# Patient Record
Sex: MALE | Race: WHITE
[De-identification: names, ages, dates, MRNs, and addresses within clinical notes are randomized per-mention and may not be internally consistent; named-entity substitution may affect disease eponyms.]

---

## 2017-02-06 ENCOUNTER — HOSPITAL ENCOUNTER (EMERGENCY)
Dept: HOSPITAL 68 - ERH | Age: 52
End: 2017-02-06
Payer: COMMERCIAL

## 2017-02-06 VITALS — BODY MASS INDEX: 28.49 KG/M2 | HEIGHT: 73 IN | WEIGHT: 215 LBS

## 2017-02-06 VITALS — SYSTOLIC BLOOD PRESSURE: 114 MMHG | DIASTOLIC BLOOD PRESSURE: 65 MMHG

## 2017-02-06 DIAGNOSIS — F10.129: Primary | ICD-10-CM

## 2017-02-06 NOTE — ED AMS/SEIZURE/WEAK/DIZZY
History of Present Illness
 
General
Chief Complaint: ETOH/Drug Related Complaint
Stated Complaint: "PER EMS ETOH"
Source: patient, EMS
Exam Limitations: no limitations
 
Vital Signs & Intake/Output
Vital Signs & Intake/Output
 Vital Signs
 
 
Date Time Temp Pulse Resp B/P Pulse O2 O2 Flow FiO2
 
     Ox Delivery Rate 
 
02/06 0304 96.7 73 18 116/64 96 Room Air  
 
 
Allergies
Coded Allergies:
No Known Allergies (10/31/16)
 
Reconcile Medications
Citalopram Hydrobromide (Citalopram HBr) 40 MG TABLET   1 TAB PO DAILY MENTAL 
HEALTH  (Reported)
Disulfiram 250 MG TABLET   1 TAB PO DAILY ALCOHOL  (Reported)
Divalproex Sodium (Depakote) 500 MG TABLET.DR   1 TAB PO BID MENTAL HEALTH  (
Reported)
Divalproex Sodium 250 MG TABLET.DR   1 TAB PO BID MENTAL HEALTH  (Reported)
Lamotrigine (Lamictal 25MG) 25 MG TABLET   5 TAB PO DAILY MENTAL HEALTH  (
Reported)
Methylphenidate Hydrochloride (Ritalin LA) 20 MG CER   20 MG PO QAM ADHD  (
Reported)
Metoprolol Succinate 50 MG TAB.ER.24H   1 TAB PO DAILY HEART  (Reported)
Rosuvastatin Calcium (Crestor) 5 MG TABLET   5 MG PO DAILY CHOLESTEROL  (
Reported)
Sertraline HCl 100 MG TABLET   1 TAB PO DAILY MENTAL HEALTH  (Reported)
 
Triage Nurses Notes Reviewed? yes
Onset: Gradual
Duration: hour(s):
Timing: recent history
Injury Environment: home
Severity: mild, moderate
Modifying Factors:
Improves With: rest. 
Associated Symptoms: "I feel fine and want to go home."
HPI:
51-year-old gentleman history of alcohol abuse presents with mental status 
change.  He states that he missed a dose of Antabuse, "and that gave me the idea
that I could drink tonight."  His roommate called for his aberrant behavior 
consistent with prior episodes of intoxication.
 
He states that he has been drinking tonight and that he feels otherwise well.  
He denies suicidality homicidality or hallucinations.  He does not wish detox.  
He states, "I'll just sleep it off and leave in the morning."
 
Past History
 
Travel History
Traveled to [place] past 21 day No
 
Medical History
Any Pertinent Medical History? see below for history
Neurological: NONE
EENT: NONE
Cardiovascular: hypertension, hyperlipidemia
Respiratory: NONE
Gastrointestinal: NONE
Hepatic: NONE
Renal: NONE
Musculoskeletal: chronic back pain
Psychiatric: bipolar disease
Endocrine: NONE
Blood Disorders: NONE
 
Surgical History
Surgical History: none
 
Psychosocial History
Who do you live with Family
What is your primary language English
 
Family History
Hx Contributory? No
 
Review of Systems
 
Review of Systems
Constitutional:
Reports: no symptoms. 
EENTM:
Reports: no symptoms. 
Respiratory:
Reports: no symptoms. 
Cardiovascular:
Reports: no symptoms. 
GI:
Reports: no symptoms. 
Genitourinary:
Reports: no symptoms. 
Musculoskeletal:
Reports: no symptoms. 
Skin:
Reports: no symptoms. 
Neurological/Psychological:
Reports: no symptoms. 
Hematologic/Endocrine:
Reports: no symptoms. 
Immunologic/Allergic:
Reports: no symptoms. 
All Other Systems: Reviewed and Negative
 
Physical Exam
 
Physical Exam
General Appearance: well developed/nourished
Head: atraumatic
Eyes:
Bilateral: normal appearance. 
Ears, Nose, Throat: normal pharynx, normal ENT inspection
Neck: normal inspection, supple, full range of motion
Respiratory: normal breath sounds, chest non-tender, no respiratory distress
Cardiovascular: regular rate/rhythm
Gastrointestinal: normal bowel sounds, soft, non-tender
Back: normal inspection
Extremities: normal range of motion
Neurologic/Psych: no motor/sensory deficits, awake, alert, oriented x 3
Skin: intact, normal color, warm/dry
 
Core Measures
ACS in differential dx? No
CVA/TIA Diagnosis: No
Severe Sepsis Present: No
Septic Shock Present: No
 
Progress
Differential Diagnosis: alcohol intoxication versus other
Plan of Care:
He declines detox.  He would like to rest for now and leave in the morning.
Initial ED EKG: none
 
Departure
 
Departure
Disposition: HOME OR SELF CARE
Condition: Stable
Clinical Impression
Primary Impression: Alcohol intoxication
Referrals:
DIETER BAUTISTA MD (PCP/Family)
 
Departure Forms:
Customer Survey
General Discharge Information
Comments
2/6/17, 2:45am... pt breathylized 0.022 and .030.... pt to rest this am and will
be safe for discharge in the morning. 
 
2/6/17, 5:26am... pt resting comfortably.  pt wishes to leave, denies SI/HI/
hallucinations.  He ambulates well.  Lucid in ED, able to make his own 
decisions.

## 2017-12-23 ENCOUNTER — HOSPITAL ENCOUNTER (INPATIENT)
Dept: HOSPITAL 68 - ERH | Age: 52
LOS: 6 days | DRG: 754 | End: 2017-12-29
Attending: PSYCHIATRY & NEUROLOGY | Admitting: PSYCHIATRY & NEUROLOGY
Payer: COMMERCIAL

## 2017-12-23 VITALS — HEIGHT: 72 IN | WEIGHT: 190 LBS | BODY MASS INDEX: 25.73 KG/M2

## 2017-12-23 VITALS — DIASTOLIC BLOOD PRESSURE: 89 MMHG | SYSTOLIC BLOOD PRESSURE: 149 MMHG

## 2017-12-23 DIAGNOSIS — F32.9: Primary | ICD-10-CM

## 2017-12-23 DIAGNOSIS — F10.20: ICD-10-CM

## 2017-12-23 LAB
ABSOLUTE GRANULOCYTE CT: 5.8 /CUMM (ref 1.4–6.5)
BASOPHILS # BLD: 0 /CUMM (ref 0–0.2)
BASOPHILS NFR BLD: 0.3 % (ref 0–2)
EOSINOPHIL # BLD: 0.2 /CUMM (ref 0–0.7)
EOSINOPHIL NFR BLD: 2.8 % (ref 0–5)
ERYTHROCYTE [DISTWIDTH] IN BLOOD BY AUTOMATED COUNT: 13 % (ref 11.5–14.5)
GRANULOCYTES NFR BLD: 70.5 % (ref 42.2–75.2)
HCT VFR BLD CALC: 45.9 % (ref 42–52)
LITHIUM SERPL-SCNC: 0.4 MMOL/L (ref 0.6–1.2)
LYMPHOCYTES # BLD: 1.5 /CUMM (ref 1.2–3.4)
MCH RBC QN AUTO: 28.3 PG (ref 27–31)
MCHC RBC AUTO-ENTMCNC: 33.8 G/DL (ref 33–37)
MCV RBC AUTO: 83.6 FL (ref 80–94)
MONOCYTES # BLD: 0.6 /CUMM (ref 0.1–0.6)
PLATELET # BLD: 205 /CUMM (ref 130–400)
PMV BLD AUTO: 8.5 FL (ref 7.4–10.4)
RED BLOOD CELL CT: 5.49 /CUMM (ref 4.7–6.1)
WBC # BLD AUTO: 8.2 /CUMM (ref 4.8–10.8)

## 2017-12-23 PROCEDURE — G0463 HOSPITAL OUTPT CLINIC VISIT: HCPCS

## 2017-12-23 PROCEDURE — G0480 DRUG TEST DEF 1-7 CLASSES: HCPCS

## 2017-12-23 NOTE — ED PSYCHIATRIC COMPLAINT
**See Addendum**
History of Present Illness
 
General
Chief Complaint: ETOH/Drug Related Complaint
Stated Complaint: +si
Source: patient
Exam Limitations: no limitations
 
Vital Signs & Intake/Output
Vital Signs & Intake/Output
 Vital Signs
 
 
Date Time Temp Pulse Resp B/P B/P Pulse O2 O2 Flow FiO2
 
     Mean Ox Delivery Rate 
 
12/23 1425 97.9 90 18 164/98  97 Room Air  
 
12/23 1216 96.5 96 16 146/97  98 Room Air  
 
 
 
Allergies
Coded Allergies:
No Known Allergies (10/31/16)
 
Reconcile Medications
Aripiprazole (Abilify) 10 MG TABLET   1 TAB PO 2000 DEPRESSION
Bupropion HCl (Wellbutrin XL) 150 MG TAB.ER.24H   1 TAB PO 0800 DEPRESSION
Disulfiram 250 MG TABLET   1 TAB PO DAILY ALCOHOL  (Reported)
Gabapentin 300 MG CAPSULE   2 TAB PO AT BEDTIME SLEEP
Lithium Carbonate 300 MG CAPSULE   1 TAB PO BID MOOD STABILIZER
     .
Metoprolol Succ XL (Toprol XL) 25 MG TAB   0.5 TAB PO DAILY HTN
Venlafaxine HCl (Effexor XR) 75 MG CAP.ER.24H   1 TAB PO DAILY DEPRESSION
 
Triage Nurses Notes Reviewed? yes
Onset: Abrupt
Duration: day(s): (FEW)
Timing: recent history
Severity: severe
Associated Symptoms: anxiety, suicidal ideation
HPI:
52 year old male with history of depression/bipolar illness presents to the ER 
for evaluation for suicidal ideation.  He states he had his car beside the train
tracks and was thinking of killing himself.  He thought about his aunt "maribell" 
and wondered what she would have thought of him so he decided to come for help. 
He states that his wife tells him he is a failure and constantly berates him.  
He states he cannot take it anymore.  He has history of suicidal ideation in the
past before.  Denies any illicit drug use, admits to occasional alcohol abuse.  
He states he works part time and is supporting his disabled wife and 4 children.
 
Past History
 
Medical History
Any Pertinent Medical History? see below for history
Neurological: NONE
EENT: NONE
Cardiovascular: hypertension, hyperlipidemia
Respiratory: NONE
Gastrointestinal: NONE
Hepatic: NONE
Renal: NONE
Musculoskeletal: chronic back pain
Psychiatric: bipolar disease, ETOH ABUSE
Endocrine: NONE
Blood Disorders: NONE
GYN/Reproductive: n/a
 
Surgical History
Surgical History: none
 
Psychosocial History
Who do you live with Family
What is your primary language English
 
Family History
Hx Contributory? No
 
Review of Systems
 
Review of Systems
Constitutional:
Denies: chills, fever. 
EENTM:
Reports: no symptoms. 
Respiratory:
Denies: cough, short of breath. 
Cardiovascular:
Denies: chest pain, palpitations. 
GI:
Denies: abdominal pain. 
Genitourinary:
Reports: no symptoms. 
Musculoskeletal:
Reports: no symptoms. 
Skin:
Reports: no symptoms. 
Neurological/Psychological:
Reports: no symptoms. 
Hematologic/Endocrine:
Denies: bruising, bleeding. 
Immunologic/Allergic:
Denies: splenectomy. 
All Other Systems: Reviewed and Negative
 
Physical Exam
 
Physical Exam
General Appearance: well developed/nourished, mild distress
Head: atraumatic
Eyes:
Bilateral: PERRL, EOMI. 
Ears, Nose, Throat: normal pharynx, normal ENT inspection, hearing grossly 
normal
Neck: normal inspection, supple
Respiratory: normal breath sounds
Cardiovascular: regular rate/rhythm
Gastrointestinal: soft, non-tender
Extremities: normal range of motion
Neurological/Psychiatric: awake, alert, depressed affect
Appearance/Memory/Insight: disheveled, impaired insight
Behavoir/Eye Contact/Speech: cooperative, normal speech
Thoughts/Hallucinations: no apparent hallucination
Skin: intact, normal color, warm/dry
SAD PERSONS
 
 
SAD PERSONS Response Value
 
Male Sex? yes 1
 
Age <19 or >45 years? yes 1
 
Depression/Hopelessness? yes 2
 
Previous Attempts/Psych Care yes 1
 
Excessive Ethanol/Drug Use? yes 1
 
Rational Thinking Loss? yes 2
 
Organized/Serious Attempt yes 2
 
Social Support? has no support 1
 
Stated Future Intent? yes 2
 
Total   13
 
 
SAD PERSONS Done? yes
 
Progress
Differential Diagnosis: DEPRESSION, SUICIDAL IDEATION, MEDICATION NONCOMPLIANCE
Plan of Care:
 Orders
 
 
Procedure Date/time Status
 
Regular Diet 12/24 B Active
 
Regular Diet 12/23 D Complete
 
Pathway - chart 12/23 1756 Active
 
Patient Data - inpatient psych 12/23 1746 Active
 
Admit to inpatient psych 12/23 1746 Active
 
EKG 12/23 1746 Active
 
Add-on Test (ER Only) 12/23 1400 Active
 
LITHIUM 12/23 1258 Complete
 
ED CRISIS PSYCH CONSULT 12/23 1224 Active
 
Continuous Observation Monitor 12/23 1213 Active
 
URINE DRUGS OF ABUSE 12/23 1213 Complete
 
ETHANOL 12/23 1213 Complete
 
COMPREHENSIVE METABOLIC PANEL 12/23 1213 Complete
 
CBC WITHOUT DIFFERENTIAL 12/23 1213 Complete
 
Vital Signs 12/23  UNK Active
 
Nursing Misc 12/23  UNK Active
 
CIWA 12/23  UNK Active
 
Alternative Nursing Therapy 12/23  UNK Active
 
Activity/Ambulation 12/23  UNK Active
 
 
 Current Medications
 
 
  Sig/Bettie Start time  Last
 
Medication Dose  Stop Time Status Admin
 
Folic Acid 1 MG DAILY 12/24 1000 UNVr 
 
(Folic Acid)   12/26 1001  
 
Metoprolol Succinate 12.5 MG DAILY 12/24 1000 UNVr 
 
(Toprol XL)     
 
Multivitamins 1 TAB DAILY 12/24 1000 UNVr 
 
(Theragran Vitamins)     
 
Thiamine HCl 100 MG DAILY 12/24 1000 UNVr 
 
(Vitamin B1)   12/26 1001  
 
Venlafaxine HCl 75 MG DAILY 12/24 1000 UNVr 
 
(Effexor Xr)     
 
Bupropion HCl 150 MG 0800 12/24 0800 UNVr 
 
(Wellbutrin XL)     
 
Gabapentin 600 MG AT BEDTIME 12/23 2200 UNVr 
 
(Neurontin)     
 
Lithium Carbonate 300 MG BID 12/23 2200 UNVr 
 
(Lithium Carbonate)     
 
Aripiprazole 10 MG 2000 12/23 2000 UNVr 
 
(Abilify)     
 
Lithium Carbonate 300 MG 0800,2000 12/23 2000 CANr 
 
(Lithium Carbonate)     
 
Acetaminophen 650 MG Q6-PRN PRN 12/23 1800 UNVr 
 
(Tylenol)     
 
Al Hydroxide/Mg  30 ML Q4-6 PRN PRN 12/23 1800 UNVr 
 
Hydroxide     
 
(Maalox Plus)     
 
Benztropine Mesylate 1 MG Q6P PRN 12/23 1800 UNVr 
 
(Cogentin 1 MG      
 
Tablet)     
 
Benztropine Mesylate 1 MG Q6P PRN 12/23 1800 UNVr 
 
(Cogentin)     
 
Haloperidol 5 MG Q6P PRN 12/23 1800 UNVr 
 
(Haldol)     
 
Haloperidol 5 MG Q6P PRN 12/23 1800 UNVr 
 
(Haldol)     
 
Hydroxyzine HCl 50 MG Q4-PRN PRN 12/23 1800 UNVr 
 
(Atarax)     
 
Lorazepam 2 MG Q2P PRN 12/23 1800 UNVr 
 
(Ativan)     
 
Lorazepam 1 MG Q2P PRN 12/23 1800 UNVr 
 
(Ativan)     
 
Lorazepam 2 MG Q6P PRN 12/23 1800 UNVr 
 
(Ativan)     
 
Lorazepam 2 MG Q6P PRN 12/23 1800 UNVr 
 
(Ativan)     
 
Nicotine 2 MG Q2 HRS AS NEEDED PRN 12/23 1800 UNVr 
 
(Nicotine)     
 
Trazodone HCl 50 MG AT BEDTIME AS NEED.. 12/23 1800 UNVr 
 
(Desyrel)     
 
 
 Laboratory Tests
 
 
 
12/23/17 1306:
Urine Opiates Screen < 100.00, Methadone Screen < 40, Barbiturate Screen < 60, 
Ur Phencyclidine Scrn < 6.00, Amphetamines Screen < 100, U Benzodiazepines Scrn 
< 85, Urine Cocaine Screen < 50, Urine Cannabis Screen < 5.00
 
12/23/17 1258:
Lithium Cancelled
 
12/23/17 1258:
Anion Gap 14, Estimated GFR > 60, BUN/Creatinine Ratio 14.2, Glucose 86, Calcium
9.9, Total Bilirubin 0.5, AST 24, ALT 39, Alkaline Phosphatase 60, Total Protein
7.8, Albumin 4.6, Globulin 3.2, Albumin/Globulin Ratio 1.4, CBC w Diff NO MAN 
DIFF REQ, RBC 5.49, MCV 83.6, MCH 28.3, RDW 13.0, MPV 8.5, Gran % 70.5, 
Lymphocytes % 18.7  L, Monocytes % 7.7, Eosinophils % 2.8, Basophils % 0.3, 
Absolute Granulocytes 5.8, Absolute Lymphocytes 1.5, Absolute Monocytes 0.6, 
Absolute Eosinophils 0.2, Absolute Basophils 0, PUBS MCHC 33.8, Lithium 0.4  L, 
Serum Alcohol 59.0
 
 
17:40 PM
PATIENT ADMITTED TO [facility].
 
Departure
 
Departure
Time of Disposition: 1742
Disposition: STILL A PATIENT
Condition: Stable
Clinical Impression
Primary Impression: Major depression
Referrals:
Beena Hassan DO (PCP/Family)
 
Departure Forms:
Customer Survey
General Discharge Information
 
Psych Admission Note
Psychiatric Admission:
I have seen and evaluated EDEL HARDY.
 
I have also reviewed all the pertinent lab results and diagnostic results.
 
EDEL HARDY will be admitted to our inpatient Psychiatric unit for treatment 
and care.

## 2017-12-23 NOTE — PN- GEN MED
Assessment/Plan
Assessment:
 
53 YO M with medical Hx of bipolar disorder, alcoholism and HTN came to the ER 
for suicidal ideation.  According to him, he [place] his car beside the train tracks
and was thinking of killing himself. He states that his wife tells him he is a 
failure and constantly berates him.  
He has history of suicidal ideation in the past before and was admitted in 
inpatient psych on Nov 27 - Dec 1 for overdose and suicidal ideation.  
He denies any illicit drug use but admits to heavy alcohol abuse. He works part 
time in [employer]. 
He does not offer any complaints and complete ROS unremarkable, compliant with 
his medications. 
 
 
Problem List:
 1. Suicide ideation
 
 2. Bipolar affective, depress, unspec
 
Plan:
 
#1 Bipolar disorder with suicidal ideation : agree with psychiatrist plan 
 
#2 alcoholism : agree with psychiatry plan 
 
# HTN : continue Metoprolol
 
 
DVT/Prophylaxis: early ambulation low risk
 
Subjective
Follow-up For:
SI, bipolar disorder, HTN 
Complaints: no complaints
Subjective:
No Complaints, See Assessment. 
 
Review of Systems
Constitutional:
Reports: no symptoms. 
EENTM:
Reports: no symptoms. 
Cardiovascular:
Reports: no symptoms. 
Respiratory:
Reports: no symptoms. 
Gastrointestinal:
Reports: no symptoms. 
Genitourinary:
Reports: no symptoms. 
Musculoskeletal:
Reports: no symptoms. 
Skin:
Reports: no symptoms. 
Neurological/Psychological:
Reports: no symptoms. 
Hematologic/Endocrine:
Reports: no symptoms. 
 
Objective
Last 24 Hrs of Vital Signs/I&O
 Vital Signs
 
 
Date Time Temp Pulse Resp B/P B/P Pulse O2 O2 Flow FiO2
 
     Mean Ox Delivery Rate 
 
12/23 1858 97.3 88  149/89     
 
12/23 1858 97.3 88  149/89     
 
12/23 1839 97.6 94 18 140/81  97 Room Air  
 
12/23 1838  90  164/98     
 
12/23 1425 97.9 90 18 164/98  97 Room Air  
 
12/23 1216 96.5 96 16 146/97  98 Room Air  
 
 
 Intake & Output
 
 
 12/23 0000 12/23 0800 12/23 1600
 
Intake Total   
 
Output Total   
 
Balance   
 
    
 
Patient   86.183 kg
 
Weight   
 
Weight   Reported by Patient
 
Measurement   
 
Method   
 
 
 
 
Physical Exam
General Appearance: Alert, Oriented X3, Cooperative, No Acute Distress
Skin: No Rashes
HEENT: Atraumatic, PERRLA, EOMI
Neck: Supple, No JVD, No thryomegaly, +2 Carotid Pulse wo Bruit
Lymphatic: Cervical nl
Cardiovascular: Regular Rate, Normal S1, Normal S2, No Murmurs
Lungs: Clear to Auscultation, Normal Air Movement
Abdomen: Normal Bowel Sounds, Soft, No Tenderness
Neurological: Normal Gait, Normal Speech, Strength at 5/5 X4 Ext, Normal Tone, 
Sensation Intact, Cranial Nerves 3-12 NL, Reflexes 2+
Extremities: No Clubbing, No Cyanosis, No Edema
Vascular: Pulses Symmetrical
Current Medications:
 Current Medications
 
 
  Sig/Bettie Start time  Last
 
Medication Dose Route Stop Time Status Admin
 
Acetaminophen 650 MG Q6-PRN PRN 12/23 1800 AC 
 
  PO   
 
Acetaminophen 0 .STK-MED ONE 12/23 1658 DC 
 
  PO   
 
Al Hydroxide/Mg  30 ML Q4-6 PRN PRN 12/23 1800 AC 
 
Hydroxide  PO   
 
Aripiprazole 10 MG 2000 12/23 2000 AC 12/23
 
  PO   2144
 
Aripiprazole 10 MG DAILY 12/23 1504 DC 
 
  PO   
 
Benztropine Mesylate 1 MG Q6P PRN 12/23 1800 AC 
 
  PO   
 
Benztropine Mesylate 1 MG Q6P PRN 12/23 1800 AC 
 
  IM   
 
Bupropion HCl 150 MG 0800 12/24 0800 AC 
 
  PO   
 
Bupropion HCl 150 MG DAILY 12/23 1504 DC 12/23
 
  PO   1657
 
Folic Acid 1 MG DAILY 12/24 1000 AC 
 
  PO 12/26 1001  
 
Gabapentin 600 MG AT BEDTIME 12/23 2200 AC 12/23
 
  PO   2144
 
Haloperidol 5 MG Q6P PRN 12/23 1800 AC 
 
  PO   
 
Haloperidol 5 MG Q6P PRN 12/23 1800 AC 
 
  IM   
 
Hydroxyzine HCl 50 MG Q4-PRN PRN 12/23 1800 AC 
 
  PO   
 
Lithium Carbonate 300 MG BID 12/23 2200 AC 12/23
 
  PO   2144
 
Lithium Carbonate 300 MG 0800,2000 12/23 2000 CAN 
 
  PO   
 
Lorazepam 2 MG Q2P PRN 12/23 1800 AC 
 
  PO   
 
Lorazepam 1 MG Q2P PRN 12/23 1800 AC 
 
  PO   
 
Lorazepam 2 MG Q6P PRN 12/23 1800 AC 
 
  PO   
 
Lorazepam 2 MG Q6P PRN 12/23 1800 AC 
 
  IM   
 
Metoprolol Succinate 12.5 MG DAILY 12/24 1000 AC 
 
  PO   
 
Metoprolol Succinate 12.5 MG ONCE ONE 12/23 1830 DC 12/23
 
  PO 12/23 1831  1838
 
Multivitamins 1 TAB DAILY 12/24 1000 AC 
 
  PO   
 
Nicotine 2 MG Q2 HRS AS NEEDED PRN 12/23 1800 AC 
 
  PO   
 
Thiamine HCl 100 MG DAILY 12/24 1000 AC 
 
  PO 12/26 1001  
 
Trazodone HCl 50 MG AT BEDTIME AS NEED.. 12/23 1800 AC 12/23
 
  PO   2145
 
Venlafaxine HCl 75 MG DAILY 12/24 1000 AC 
 
  PO   
 
Venlafaxine HCl 75 MG DAILY 12/23 1504 DC 12/23
 
  PO   1657
 
 
 
Last 24 Hrs of Labs/Mics:
 Laboratory Tests
 
12/23/17 1306:
Urine Opiates Screen < 100.00, Methadone Screen < 40, Barbiturate Screen < 60, 
Ur Phencyclidine Scrn < 6.00, Amphetamines Screen < 100, U Benzodiazepines Scrn 
< 85, Urine Cocaine Screen < 50, Urine Cannabis Screen < 5.00
 
12/23/17 1258:
Lithium Cancelled
 
12/23/17 1258:
Anion Gap 14, Estimated GFR > 60, BUN/Creatinine Ratio 14.2, Glucose 86, Calcium
9.9, Total Bilirubin 0.5, AST 24, ALT 39, Alkaline Phosphatase 60, Total Protein
7.8, Albumin 4.6, Globulin 3.2, Albumin/Globulin Ratio 1.4, CBC w Diff NO MAN 
DIFF REQ, RBC 5.49, MCV 83.6, MCH 28.3, RDW 13.0, MPV 8.5, Gran % 70.5, 
Lymphocytes % 18.7  L, Monocytes % 7.7, Eosinophils % 2.8, Basophils % 0.3, 
Absolute Granulocytes 5.8, Absolute Lymphocytes 1.5, Absolute Monocytes 0.6, 
Absolute Eosinophils 0.2, Absolute Basophils 0, PUBS MCHC 33.8, Lithium 0.4  L, 
Serum Alcohol 59.0
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
## 2017-12-24 VITALS — SYSTOLIC BLOOD PRESSURE: 140 MMHG | DIASTOLIC BLOOD PRESSURE: 84 MMHG

## 2017-12-24 VITALS — DIASTOLIC BLOOD PRESSURE: 78 MMHG | SYSTOLIC BLOOD PRESSURE: 134 MMHG

## 2017-12-24 VITALS — SYSTOLIC BLOOD PRESSURE: 136 MMHG | DIASTOLIC BLOOD PRESSURE: 96 MMHG

## 2017-12-24 VITALS — DIASTOLIC BLOOD PRESSURE: 93 MMHG | SYSTOLIC BLOOD PRESSURE: 136 MMHG

## 2017-12-24 VITALS — SYSTOLIC BLOOD PRESSURE: 148 MMHG | DIASTOLIC BLOOD PRESSURE: 100 MMHG

## 2017-12-24 VITALS — SYSTOLIC BLOOD PRESSURE: 137 MMHG | DIASTOLIC BLOOD PRESSURE: 90 MMHG

## 2017-12-24 VITALS — DIASTOLIC BLOOD PRESSURE: 90 MMHG | SYSTOLIC BLOOD PRESSURE: 131 MMHG
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
## 2017-12-25 VITALS — DIASTOLIC BLOOD PRESSURE: 88 MMHG | SYSTOLIC BLOOD PRESSURE: 132 MMHG

## 2017-12-25 VITALS — DIASTOLIC BLOOD PRESSURE: 96 MMHG | SYSTOLIC BLOOD PRESSURE: 140 MMHG

## 2017-12-25 VITALS — DIASTOLIC BLOOD PRESSURE: 96 MMHG | SYSTOLIC BLOOD PRESSURE: 142 MMHG

## 2017-12-25 VITALS — SYSTOLIC BLOOD PRESSURE: 140 MMHG | DIASTOLIC BLOOD PRESSURE: 77 MMHG
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
## 2017-12-26 VITALS — DIASTOLIC BLOOD PRESSURE: 96 MMHG | SYSTOLIC BLOOD PRESSURE: 142 MMHG

## 2017-12-26 VITALS — DIASTOLIC BLOOD PRESSURE: 96 MMHG | SYSTOLIC BLOOD PRESSURE: 160 MMHG

## 2017-12-26 VITALS — SYSTOLIC BLOOD PRESSURE: 144 MMHG | DIASTOLIC BLOOD PRESSURE: 98 MMHG

## 2017-12-26 VITALS — DIASTOLIC BLOOD PRESSURE: 100 MMHG | SYSTOLIC BLOOD PRESSURE: 146 MMHG

## 2017-12-26 NOTE — SOCIAL WORKER PROG NOTE PSYCH
Social Work Progress Note
Progress Note
[facility] concurrent review entered:
 
Member Name 
Member ID 
Member  
Subscriber Name 
Subscriber ID 
EDEL HARDY 
[id number] 
1965 
EDEL HARDY 
[id number] 
Pended Authorization # 
Client Authorization # 
Type of Request 
[id number] 
[id number] 
CONCURRENT 
Date of Admission/ Start of Services 
Requested From 
Submission Date 
2017 
Level of Service 
Type of Service 
Level of Care 
Type of Care 
INPATIENT/OC 
Mental Health 
Inpatient 
Inpatient Hospital - Inpatient Hospital

## 2017-12-26 NOTE — CP SOUTH PROGRESS NOTE PSYCH
Psych (Inpt) Progress Note
Progress Note
I reviewed Dr. Nation note and Dr. Doss's notes from the weekend.
Medication list reviewed.  Case discussed in multidisciplinary treatment team 
meeting.  
 
Summary:
Vikki was admitted to [facility] because of suicidal ideation to jump in front of a 
train.  +Alcohol use.  
 
Mental Status Examination:
Vikki was alert and oriented to time, place, and person. He is still depressed 
but reported feeling better. He reported feeling off kilter/dizzy.
Affect is calm and stolid. He reported very little anxiety/manageable.
He denied feeling hopeless or worthless, he denied wishing death or thinking of 
suicide
Vikki did not seem sure he is ready for discharge, he said he has a job and not
sure if he is going to be able to do IOP. He denied hallucinations and denied 
feeling paranoid. I did not observe any delusions.
 
IMPRESSION: 
Slow progress.  Reported feeling off kilter/dizzy, otherwise tolerating meds, 
no tremor.
A repeat lithium level was ordered by Dr. Eli for tomorrow
 
Treatment Plan Update:
D/C Nicotine
D/C Gabapentin
Resume Naltrexone 50 mg daily
D/C Hydroxyzine

## 2017-12-26 NOTE — SOCIAL WORKER PROG NOTE PSYCH
Social Work Progress Note
Progress Note
2:30pm
This writer met with patient.  He discussed events leading to current inpatient 
admission in which he had a plan to "throw myself in front of a train," (he 
drove to the train tracks) which he ultimately decided against doing when he 
thought about his "aunt Cristal" as well as his (4) children.  He identified 
triggers as only working part time and feeling that he should be more successful
in life (an established career, owning a home, money saved for [facility] and 
being a more effective father (spending more time with his children).  Patient 
reported occassional alcohol use and admitted to using (4 beers) prior to 
driving to the train tracks.  He identified alcohol as a means to "escape" how 
he had been feeling.  Patient appeared ambivalent and somewhat resistant to 
attending IOP, stating that he felt it would conflict with his work schedule.  
He was agreeable to scheduling a family meeting with his wife.  Patient denied 
SI today and stated that he felt safe on this unit.  He agreed to immediately 
inform nursing/staff should he feel unsafe or have other concerns.

## 2017-12-27 VITALS — SYSTOLIC BLOOD PRESSURE: 168 MMHG | DIASTOLIC BLOOD PRESSURE: 99 MMHG

## 2017-12-27 VITALS — DIASTOLIC BLOOD PRESSURE: 97 MMHG | SYSTOLIC BLOOD PRESSURE: 141 MMHG

## 2017-12-27 VITALS — DIASTOLIC BLOOD PRESSURE: 98 MMHG | SYSTOLIC BLOOD PRESSURE: 141 MMHG

## 2017-12-27 VITALS — SYSTOLIC BLOOD PRESSURE: 144 MMHG | DIASTOLIC BLOOD PRESSURE: 103 MMHG

## 2017-12-27 NOTE — SOCIAL WORKER PROG NOTE PSYCH
Social Work Progress Note
Progress Note
11:32am
This writer spoke with patient's wife, Machelle, by phone to schedule a family 
meeting.  Due to not driving, Machelle stated that she could either attend a 
family meeting by phone tomorrow (11/28/17) or attend a family meeting in person
on the day of discharge and accompany the patient home.  She stated that she 
preferred to attend in person on the day of discharge.  This writer will review 
with the team during the team meeting tomorrow morning and contact the patient's
wife by phone.  Machelle also added that she feels it would be difficult for the 
patient to attend IOP due to accepting a new, second job.  Additionally, she 
felt that the IOP was not effective due to his current admission to [facility].  
Machelle stated that the patient does not "reach out" when he needs help and 
feels that he needs to learn skills in order to utilize his supports.  Machelle 
was agreeable to bringing these thoughts and concerns to the family meeting.
 
1:05pm
This writer met with patient.  He described his mood as "alright" and stated 
that he was nervous about going home as he feels that his wife is upset with him
for being away for the holiday.  Patient maintained that he does not want to 
attend IOP due to his work schedule and would like to return to his individual 
therapist, Sowmya MCCLURE (patient was unsure of the last name).  Patient denied SI/HI
/AH/VH.  Patient was informed of the phone call between this writer and his 
wife.  He was agreeable to this plan.

## 2017-12-27 NOTE — CP SOUTH PROGRESS NOTE PSYCH
Psych (Inpt) Progress Note
Progress Note
I listened to the input of the multidisciplinary treatment team in the morning 
meeting.  
 
Summary:
Vikki was admitted to [facility] because of suicidal ideation (was thinking about 
jumping in front of a train) while intoxicated (alcohol).  
 
Mental Status Examination:
Vikki was alert and oriented to time, place, and person. Although still 
depressed, he is less depressed each passing day (by his report). He believes 
that he is not yet ready for discharge.
He is no longer off kilter or dizzy today. Affect was calm and slightly more 
animated than yesterday.
He reported that his anxiety is minimal and manageable. He denied feeling 
hopeless or worthless, and he denied wishing death or thinking of suicide. 
Vikki is still on the fence regarding IOP because I am going to be starting a 
new job
He denied hallucinations and denied feeling paranoid. I did not observe any 
delusions. He was coherent/no thought disorder.
 
Assessment:
Showing slow progress, Lithium Level from this morning was 0.7 mmol/L, BP still 
slightly elevated 
 Vitals
 
 
Blood Pressure 141/98 12/27/17 0756
 
 
Treatment Plan Update:
Increase Metoprolol XR to 25 mg daily
Continue Naltrexone 50 mg daily
Re-evaluate tomorrow

## 2017-12-28 VITALS — DIASTOLIC BLOOD PRESSURE: 98 MMHG | SYSTOLIC BLOOD PRESSURE: 160 MMHG

## 2017-12-28 VITALS — SYSTOLIC BLOOD PRESSURE: 140 MMHG | DIASTOLIC BLOOD PRESSURE: 100 MMHG

## 2017-12-28 VITALS — SYSTOLIC BLOOD PRESSURE: 137 MMHG | DIASTOLIC BLOOD PRESSURE: 91 MMHG

## 2017-12-28 VITALS — DIASTOLIC BLOOD PRESSURE: 97 MMHG | SYSTOLIC BLOOD PRESSURE: 135 MMHG

## 2017-12-28 NOTE — SOCIAL WORKER PROG NOTE PSYCH
Social Work Progress Note
Progress Note
This writer spoke with patient's wife, Machelle, to schedule a phone session with
the patient this afternoon at 3pm.  In addition, at patient's directive due to 
not having it himself, Machelle provided his therapist's contact information: 
Diane Casalaina, cell - [phone] and work - [phone].
 
The following [facility] appoinments have been scheduled:
Intake with Niru Melendez LCSW 1/3/18 at 10:30am
Medication appointment with Farzana Blood, APRN: 1/23/18 at 10:30am
 
As informed by Farzana, she is willing/able to meet with the patient monthly for 
medication management if the patient is seen weekly in some format.  If the 
patient's therapist is unable to see him weekly, then he may attend the relapse 
prevention group.  Should patient struggle with symptoms, then she will 
recommend that he attends the relapse prevention group or IOP.  Patient is also 
offered to attend as many services he wishes to attend should he wish to attend 
more than weekly treatment.
 
3:02pm
Dr. Gharaibeh and this writer met with the patient and his wife, Machelle.  
Machelle attended the meeting by phone.  Machelle discussed concerns about the 
patient being unwilling to ask for help.  Patient stated that he feels as though
he is a burden when he does reach out.  Machelle described his "declines" as 
"gradual declines."  Strategies were explored to assist patient in reaching out 
for help.  He identified one or two people that he would be willing to contact 
as he initially stated that he does not have any friends.  "All of my friends 
were Machelle's friends first."  Discharge plans (as described above) were 
discussed and Machelle and the patient were both in agreement with them.  They 
were informed that this writer has not yet spoken with Diane Casalaina regarding
treatment.  This writer will inform patient once contact has been made with 
Sowmya.  Patient and his wife were both in agreement and verbally indicated 
understanding of Farzana Blood's recommendations.  Dr. Gharaibeh reviewed 
medications and responded to related questions/concerns.  Patient is anticipated
to discharge tomorrow, 12/29/17, and will return home to his wife.  He stated 
that his mother in law will provide transportation home from the hospital.
 
4:29pm
This writer spoke with Diane Casalaina, LADC by phone.  She stated that she 
plans to continue to meet with the patient on a weekly basis and has scheduled 
their next appointment (through the patient's wife, Machelle) for 11:30am on 1/2/
18.  She stated that she has worked both with the patient as well as the patient
and his wife and that he has been consistent with treatment.  Sowmya was informed
of the recommendations made by Farzana Blood and stated that she would be in 
contact with her as well.  Sowmya also stated that she did not feel that the 
patient would benefit further from IOP at this time, however, would recommend 
that he attends AA.  Sowmya thanked this writer for the call and provided the 
address of her office: 
 
[address]
[place], [place]

## 2017-12-29 VITALS — DIASTOLIC BLOOD PRESSURE: 96 MMHG | SYSTOLIC BLOOD PRESSURE: 142 MMHG

## 2017-12-29 VITALS — DIASTOLIC BLOOD PRESSURE: 93 MMHG | SYSTOLIC BLOOD PRESSURE: 113 MMHG

## 2017-12-29 NOTE — DISCHARGE SUMMARY REPORT-PSYCH
Visit Information
 
Visit Dates/Diagnosis'
Admission Date:
12/23/17
Discharge Date: 12/29/17
Reason for Admission:
suicidal ideation
Psy Discharge Primary Diag: Depression
Psy Discharge Secondary Diag: Alcohol Dependence
 
Hospital Course
 
Course
Complications:
No complications
Consultations:
Admission H & P
Allergies:
Coded Allergies:
No Known Allergies (10/31/16)
 
Hospital Course/TX Response:
Admitted this time on 12/23/2017 for "Well, I just feel like life is pointless.
" Longstanding history of mood and substance use disorders, ongoing depressive 
symptoms since dc from  (recently hospitalized at  11/27-12/1/17 for SI). 
Before this current admission he drove his car to the train tracks, parked next 
to the tracks, and waited for a train to come, contemplating ending his life by 
stepping in front of the train. He ultimately decided against killing himself, 
citing his children, and drove to the hospital for treatment. He reports having 
4 beers prior to this episode.
 
Patient treated with a combination of Effexor, Lithium and showed minimal to 
mild improvement but he was free of theoughts of suicide at the time of his 
discharge. He tolerated medications well
 
Mental Status Examination on 12/29/2017:
Vikki remains constricted in his affect but reported that his depressed mood is
better. He was alert and oriented to time, place, and person. He reported that 
he is ready for discharge. He was calm and reported that his anxiety is minimal 
and manageable. He denied feeling hopeless or worthless, and he denied wishing 
death or thinking of suicide. Vikki will be working 2 part-time jobs to support
his family and doing weekly individual therapy and monthly medication management
visits. He denied hallucinations and denied feeling paranoid. There were no 
delusions. He was coherent/no thought disorder.
 
Assessment:
During his stay he showed mild improvement in depression and is no longer 
thinking of suicide.
He feels ready to go home and treatment team is in agreement with that.
 
Treatment Plan Update:
Discharge Home
 
 
 
Discharge HBIPS
- Tobacco Use Treatment Offered
Post DC Medications Offered: Not Applicable
Post DC Tobacco Treatment Plan: Not Applicable
- EtOH/Drug Use D/O Treatment Offered
Post DC Medications Offered: Script Given-See Med List
Post DC EtOH/SubAbuse TX Plan: Refused Post DC Tx Pgm
 
Metabolic Screening
- Screen if on a Neuroleptic Medication
- Metabolic screening should include:
- Blood Pressure, BMI, Glucose or Hgb A1c, & a
- Lipid profile from within the past 365 days.
Metabolic Screening
() Not Applicable, patient not on a neuroleptic.
 
 OR
 
([X]) Patient on a neuroleptic(s) .
     Enter below results for Hemoglobin A1C, 
     and lipid panel if obtained during the last 365 days.
 
BMI: 25.700     
 
Blood Pressure: 142/96
 
Laboratory Results From [facility] (If applicable):
 Lab
 
 
Cholesterol 251 MG/DL H 12/26/17 0650
 
Cholesterol/HDL Ratio 5 % H 12/26/17 0650
 
HDL Cholesterol 48 mg/dL 12/26/17 0650
 
Hemoglobin A1c 5.5 % 12/26/17 0650
 
LDL Cholesterol, Calc 156 mg/dL H 12/26/17 0650
 
Triglycerides 239 mg/dL H 12/26/17 0650
 
Lithium 0.7 mmol/L 12/27/17 0619
 
 
 
 
Discharge Instructions
 
General Discharge Information
Multiple Neuroleptics:
([X]) Not Applicable
Discharge Diet Other
Discharge Activity Normal
DC Disposition:
D/C Home with Outpatient Individual Therapy at least weekly and Medication 
management and monitoring by ELIZABETH Jeffries
Referrals
Ordered Referrals
Provider Referral 01/03/18
For Groups:
[ Outpatient]
 
[facility]
 
[address] [place] [address]
 
[phone]
 
 
 
Intake appointment: 1/3/18 at 10:30am 
with Niru Melendez LCSW
 
Provider Referral 01/23/18
For Groups:
[ Outpatient]
 
[facility] Out[facility]
 
[address] [place] [address]
 
[phone]
 
 
 
Appointment: 1/23/18 at 9:30am with 
ELIZABETH Jeffries
 
Provider Referral 01/02/18
For Providers:
[Daine Casalaina, LADC]
 
Diane Casalaina, LADC
 
[address]
 
[address]
 
[place], [place]
 
[phone]
 
 
 
Prescriptions
Stop taking the following medications:
Disulfiram (Disulfiram) 250 MG TABLET ORAL DAILY Qty = 30
 
Metoprolol Succ XL (Toprol XL) 25 MG TAB ORAL DAILY Qty = 7
 
Lithium Carbonate (Lithium Carbonate) 300 MG CAPSULE ORAL TWICE DAILY Qty = 30
 
Continue taking these medications:
Gabapentin (Gabapentin) 300 MG CAPSULE
    2 Tablet ORAL AT BEDTIME
    Qty = 30
    Comments:
       Last Taken:11/30/17
             Time:2142 pm
 
Bupropion HCl (Wellbutrin XL) 150 MG TAB.ER.24H
    1 Tablet ORAL DAILY @8 AM
    Qty = 14
    Comments:
       Last Taken:12/1/17
             Time:0812 am
 
Venlafaxine HCl (Effexor XR) 75 MG CAP.ER.24H
    1 Tablet ORAL DAILY
    Qty = 14
    Comments:
       Last Taken:12/1/17
             Time:0812 am
 
Aripiprazole (Abilify) 10 MG TABLET
    1 Tablet ORAL 2000
    Qty = 14
    Comments:
       Last Taken:11/30/17
             Time:1946 pm
 
Start taking the following new medications:
Metoprolol Succ XL (Toprol XL) 25 MG TAB
    25 Milligram ORAL 5 PM
    Qty = 30
    No Refills
 
Naltrexone HCl (Naltrexone HCl) 50 MG TABLET
    50 Milligram ORAL DAILY
    Qty = 14
    No Refills
 
Trazodone HCl (Trazodone HCl) 50 MG TABLET
    50 Milligram ORAL AT BEDTIME as needed for insomnia
    Qty = 14
    No Refills
 
Venlafaxine HCl (Effexor XR) 75 MG CAP.ER.24H
    150 Milligram ORAL DAILY @8 AM
    Qty = 14
    No Refills
 
Lithium Carbonate (Lithium Carbonate) 300 MG CAPSULE
    600 Milligram ORAL AT BEDTIME
    Qty = 14
    No Refills
 
Lithium Carbonate (Lithium Carbonate) 300 MG CAPSULE
    300 Milligram ORAL DAILY @8 AM
    Qty = 14
    No Refills
 
 
Copies To:
ELIZABETH Jordan

## 2017-12-29 NOTE — CP SOUTH PROGRESS NOTE PSYCH
Psych (Inpt) Progress Note
Progress Note
[place] progress and treatment were discussed in the multidisciplinary 
treatment team meeting. Discharge plan/aftercare was discussed as well  
 
Summary:
Vikki was admitted to [facility] because of suicidal ideation (was thinking about 
jumping in front of a train) while intoxicated (alcohol). He has shown moderate 
improvement and is no longer thinking of suicide, he feels ready to go home and 
treatment team is in agreement with that.
 
Mental Status Examination on 12/29/2017:
Vikki remains constricted in his affect but reported that his depressed mood is
better. He was alert and oriented to time, place, and person. He reported that 
he is ready for discharge. He was calm and reported that his anxiety is minimal 
and manageable. He denied feeling hopeless or worthless, and he denied wishing 
death or thinking of suicide. Vikki will be working 2 part-time jobs to support
his family and doing weekly individual therapy and monthly medication management
visits. He denied hallucinations and denied feeling paranoid. There were no 
delusions. He was coherent/no thought disorder.
 
Assessment:
During his stay he showed mild improvement in depression and is no longer 
thinking of suicide.
He feels ready to go home and treatment team is in agreement with that.
 
Treatment Plan Update:
Discharge Home (see discharge summary and after care plan)

## 2017-12-29 NOTE — SOCIAL WORKER PROG NOTE PSYCH
Social Work Progress Note
Progress Note
This writer met with patient prior to discharge to review discharge plans.  He 
stated that he felt comfortable with discharging today, however, was concerned 
that his wife "doesn't think anything is going to change."  Patient expressed 
intention to follow through with outpatient treatment and identify strategies in
order to feel more comfortable utilizing supports.  He identified that he has a 
support system in place, referencing his wife and a friend.  Patient stated that
he will make an effort to maintain more frequent contact with his supports.  
Patient denied SI/HI/AH/VH and identified a safety plan in which he would 
immediately inform his wife, Machelle, if he feels he is in crisis.  "If it doesn
't help [talking to Machelle] I'll call Sowmya."  Patient was agreeable to 
attending at least weekly appointments upon discharge and was reminded of the 
conversation from yesterday in which he was informed of the relapse prevention 
groups.  Patient was agreeable to attending the following appointments:
-  OPS with Niru Melendez LCSW on 1/3/18 at 10:30am
-  OPS with ELIZABETH Jeffries on 1/23/18 at 9:30am
- Individual therapy with Diane Casalaina, LADC on 1/2/18 at 11:30am
 
10:04am
This writer left  for Diane Casalaina, LADC ([phone]) requesting a call 
back to provide her fax number in order to fax the discharge papers (Patient 
Health Summary).
Faxed Referral(s)
   Referred To:  OPS
   Transition of Care Documents sent: Health Summary, Transfer Summary
   Faxed to:  OPS: Farzana Armando
   Fax #: [phone]
   Faxed by: DENA Gonzáles LCSW
   Date faxed: 12/29/17
   Time Faxed: [phone]

## 2018-01-21 ENCOUNTER — HOSPITAL ENCOUNTER (INPATIENT)
Dept: HOSPITAL 68 - ERH | Age: 53
LOS: 10 days | DRG: 753 | End: 2018-01-31
Attending: PSYCHIATRY & NEUROLOGY | Admitting: PSYCHIATRY & NEUROLOGY
Payer: COMMERCIAL

## 2018-01-21 VITALS — SYSTOLIC BLOOD PRESSURE: 142 MMHG | DIASTOLIC BLOOD PRESSURE: 91 MMHG

## 2018-01-21 VITALS — HEIGHT: 72 IN | WEIGHT: 188.12 LBS | BODY MASS INDEX: 25.48 KG/M2

## 2018-01-21 DIAGNOSIS — F32.89: Primary | ICD-10-CM

## 2018-01-21 DIAGNOSIS — F10.20: ICD-10-CM

## 2018-01-21 LAB
ABSOLUTE GRANULOCYTE CT: 4 /CUMM (ref 1.4–6.5)
BASOPHILS # BLD: 0 /CUMM (ref 0–0.2)
BASOPHILS NFR BLD: 0.2 % (ref 0–2)
EOSINOPHIL # BLD: 0.2 /CUMM (ref 0–0.7)
EOSINOPHIL NFR BLD: 3.9 % (ref 0–5)
ERYTHROCYTE [DISTWIDTH] IN BLOOD BY AUTOMATED COUNT: 13.4 % (ref 11.5–14.5)
GRANULOCYTES NFR BLD: 63.8 % (ref 42.2–75.2)
HCT VFR BLD CALC: 45 % (ref 42–52)
LYMPHOCYTES # BLD: 1.6 /CUMM (ref 1.2–3.4)
MCH RBC QN AUTO: 27.6 PG (ref 27–31)
MCHC RBC AUTO-ENTMCNC: 33 G/DL (ref 33–37)
MCV RBC AUTO: 83.6 FL (ref 80–94)
MONOCYTES # BLD: 0.5 /CUMM (ref 0.1–0.6)
PLATELET # BLD: 236 /CUMM (ref 130–400)
PMV BLD AUTO: 8.8 FL (ref 7.4–10.4)
RED BLOOD CELL CT: 5.38 /CUMM (ref 4.7–6.1)
WBC # BLD AUTO: 6.3 /CUMM (ref 4.8–10.8)

## 2018-01-21 PROCEDURE — G0463 HOSPITAL OUTPT CLINIC VISIT: HCPCS

## 2018-01-21 PROCEDURE — G0480 DRUG TEST DEF 1-7 CLASSES: HCPCS

## 2018-01-21 NOTE — ED PSYCHIATRIC COMPLAINT
History of Present Illness
 
General
Chief Complaint: Psychiatric Related Complaint
Stated Complaint: PSYCH EVAL
Source: patient, old records, EMS
Exam Limitations: no limitations
 
Vital Signs & Intake/Output
Vital Signs & Intake/Output
 Vital Signs
 
 
Date Time Temp Pulse Resp B/P B/P Pulse O2 O2 Flow FiO2
 
     Mean Ox Delivery Rate 
 
01/22 0824 98.0 114 20 169/96  95 Room Air  
 
01/21 2309 98.2 98 18 142/91     
 
01/21 2309 98.2 98 18 142/91  94 Room Air  
 
01/21 1903 98.0 95 16 152/90  98 Room Air  
 
01/21 1829  96 20 148/103     
 
01/21 1718 97.6 93 16 139/78  98 Room Air  
 
 
 ED Intake and Output
 
 
 01/22 0000 01/21 1200
 
Intake Total 0 
 
Output Total  
 
Balance 0 
 
   
 
Intake, Oral 0 
 
 
 
Allergies
Coded Allergies:
No Known Allergies (10/31/16)
 
Reconcile Medications
Aripiprazole (Abilify) 10 MG TABLET   1 TAB PO 2000 DEPRESSION
Bupropion HCl (Wellbutrin XL) 150 MG TAB.ER.24H   1 TAB PO 0800 DEPRESSION
Gabapentin 300 MG CAPSULE   2 TAB PO AT BEDTIME SLEEP
Lithium Carbonate 300 MG CAPSULE   600 MG PO AT BEDTIME mood
Lithium Carbonate 300 MG CAPSULE   300 MG PO 0800 mood regulation
Metoprolol Succ XL (Toprol XL) 25 MG TAB   25 MG PO 1700 blood pressure
Naltrexone HCl 50 MG TABLET   50 MG PO DAILY cravings for alcohol
Trazodone HCl 50 MG TABLET   50 MG PO AT BEDTIME PRN insomnia
Venlafaxine HCl (Effexor XR) 75 MG CAP.ER.24H   150 MG PO 0800 depression (with 
the 75 mg)
Venlafaxine HCl (Effexor XR) 75 MG CAP.ER.24H   1 TAB PO DAILY DEPRESSION
 
Triage Nurses Notes Reviewed? yes
HPI:
Patient brought in by ambulance for suicidal ideations.  No specific plans.  
Patient states he drank 6 beers today and knows that he is not supposed to 
check.  Patient states he has been compliant with his medications.  Patient 
denies any homicidal ideations.  Patient denies any hallucinations.
(Yamil MCDOWELL,Javon SHELL)
 
Past History
 
Medical History
Any Pertinent Medical History? see below for history
Neurological: NONE
EENT: NONE
Cardiovascular: hypertension, hyperlipidemia
Respiratory: NONE
Gastrointestinal: NONE
Hepatic: NONE
Renal: NONE
Musculoskeletal: chronic back pain
Psychiatric: bipolar disease, ETOH ABUSE
Endocrine: NONE
Blood Disorders: NONE
Cancer(s): NONE
GYN/Reproductive: n/a
History of MRSA: No
History of VRE: No
History of CDIFF: No
 
Surgical History
Surgical History: none
 
Psychosocial History
Who do you live with Family
What is your primary language English
Tobacco Use: Current Daily Use
Daily Tobacco Use Amount/Type: => 5 Cigarettes daily
ETOH Use: heavy use
Illicit Drug Use: denies illicit drug use
 
Family History
Hx Contributory? No
(Yamil MCDOWELL,Javon SHELL)
 
Review of Systems
 
Review of Systems
Constitutional:
Reports: no symptoms. 
EENTM:
Reports: no symptoms. 
Respiratory:
Reports: no symptoms. 
Cardiovascular:
Reports: no symptoms. 
GI:
Reports: no symptoms. 
Genitourinary:
Reports: no symptoms. 
Musculoskeletal:
Reports: no symptoms. 
Skin:
Reports: no symptoms. 
Neurological/Psychological:
Reports: see HPI, depressed. 
Hematologic/Endocrine:
Reports: no symptoms. 
Immunologic/Allergic:
Reports: no symptoms. 
All Other Systems: Reviewed and Negative
(Yamil MCDOWELL,Javon SHELL)
 
Physical Exam
 
Physical Exam
General Appearance: well developed/nourished, mild distress
Head: atraumatic
Eyes:
Bilateral: PERRL, EOMI. 
Ears, Nose, Throat: normal pharynx, normal ENT inspection, hearing grossly 
normal
Neck: normal inspection, supple
Respiratory: normal breath sounds
Cardiovascular: regular rate/rhythm
Gastrointestinal: soft, non-tender
Extremities: normal range of motion
Neurological/Psychiatric: no motor/sensory deficits, awake, alert, calm, 
oriented x 3
Appearance/Memory/Insight: appropriate appearance, appropriate insight
Behavoir/Eye Contact/Speech: cooperative, normal speech, good eye contact
Thoughts/Hallucinations: normal thought pattern, no apparent hallucination
Skin: intact, normal color, warm/dry
SAD PERSONS Done? CRISIS CONSULT OBTAINED
(Yamil MCDOWELL,Javon SHELL)
 
Progress
Differential Diagnosis: drug intoxication, drug overdose, drug withdrawal, 
electrolyte abnormality
Plan of Care:
 Orders
 
 
Procedure Date/time Status
 
Regular Diet 01/22 B Active
 
Continuous Observation Monitor 01/22 1900 Active
 
Continuous Observation Monitor 01/22 1500 Active
 
Continuous Observation Monitor 01/22 1100 Active
 
Admit to inpatient psych 01/22 1041 Active
 
Continuous Observation Monitor 01/22 0700 Active
 
LITHIUM 01/21 1653 Complete
 
Continuous Observation Monitor 01/21 1652 Active
 
URINE DRUGS OF ABUSE 01/21 1652 Complete
 
ETHANOL 01/21 1652 Complete
 
COMPREHENSIVE METABOLIC PANEL 01/21 1652 Complete
 
CBC WITHOUT DIFFERENTIAL 01/21 1652 Complete
 
ED CRISIS PSYCH CONSULT 01/21 1652 Active
 
 
 Current Medications
 
 
  Sig/Bettie Start time  Last
 
Medication Dose  Stop Time Status Admin
 
Lorazepam 1 MG Q2P PRN 01/22 0915 UNVr 
 
(Ativan)     
 
Lorazepam 2 MG Q2 PRN 01/22 0915 UNVr 
 
(Ativan)     
 
Lithium Carbonate 300 MG 0800 01/22 0800 UNVr 01/22
 
(Lithium Carbonate)     0826
 
Venlafaxine HCl 150 MG 0800 01/22 0800 UNVr 01/22
 
(Effexor Xr)     0826
 
Gabapentin 600 MG AT BEDTIME 01/21 2200 UNVr 01/21
 
(Neurontin)     2223
 
Lithium Carbonate 600 MG AT BEDTIME 01/21 2200 UNVr 01/21
 
(Lithium Carbonate)     2223
 
Metoprolol Succinate 25 MG 1700 01/21 1700 UNVr 01/21
 
(Toprol XL)     1829
 
Trazodone HCl 50 MG AT BEDTIME PRN 01/21 1700 AC 
 
(Desyrel)     
 
 
 Laboratory Tests
 
 
 
01/21/18 1802:
Urine Opiates Screen < 100.00, Methadone Screen < 40, Barbiturate Screen < 60, 
Ur Phencyclidine Scrn < 6.00, Amphetamines Screen < 100, U Benzodiazepines Scrn 
< 85, Urine Cocaine Screen < 50, Urine Cannabis Screen < 5.00
 
01/21/18 1706:
Serum Alcohol 41.0
 
01/21/18 1706:
Anion Gap 13, Estimated GFR > 60, BUN/Creatinine Ratio 12.5, Glucose 94, Calcium
9.7, Total Bilirubin 0.3, AST 20, ALT 45, Alkaline Phosphatase 51, Total Protein
7.3, Albumin 4.5, Globulin 2.8, Albumin/Globulin Ratio 1.6, CBC w Diff NO MAN 
DIFF REQ, RBC 5.38, MCV 83.6, MCH 27.6, RDW 13.4, MPV 8.8, Gran % 63.8, 
Lymphocytes % 24.9, Monocytes % 7.2, Eosinophils % 3.9, Basophils % 0.2, 
Absolute Granulocytes 4.0, Absolute Lymphocytes 1.6, Absolute Monocytes 0.5, 
Absolute Eosinophils 0.2, Absolute Basophils 0, PUBS MCHC 33.0, Lithium 0.4  L
 
Hand-Off
   Endorsed To:
Leo MCDOWELL,Zia ZARAGOZA
   Endorsed Time: 1900
   Pending: consult
(Javon Lobo MD)
Hand-Off
   Endorsed To:
Brandyn Wayne MD
   Endorsed Time: 0700
   Pending: consult
(Leo MCDOWELL,Zia ZARAGOZA)
 
Departure
 
Departure
Disposition: STILL A PATIENT
Condition: Stable
Clinical Impression
Primary Impression: Depression
Secondary Impressions: Alcohol intoxication
Referrals:
Beena Hassan DO (PCP/Family)
 
Departure Forms:
Customer Survey
General Discharge Information
(Javon Lobo MD)
 
Psych Admission Note
Psychiatric Admission:
I have seen and evaluated EDEL HARDY.
 
I have also reviewed all the pertinent lab results and diagnostic results.
 
EDEL HARDY will be admitted to our inpatient Psychiatric unit for treatment 
and care.
 
(Brandyn Wayne MD)
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
## 2018-01-22 VITALS — SYSTOLIC BLOOD PRESSURE: 139 MMHG | DIASTOLIC BLOOD PRESSURE: 95 MMHG

## 2018-01-22 VITALS — SYSTOLIC BLOOD PRESSURE: 147 MMHG | DIASTOLIC BLOOD PRESSURE: 79 MMHG

## 2018-01-22 VITALS — SYSTOLIC BLOOD PRESSURE: 145 MMHG | DIASTOLIC BLOOD PRESSURE: 98 MMHG

## 2018-01-22 VITALS — SYSTOLIC BLOOD PRESSURE: 147 MMHG | DIASTOLIC BLOOD PRESSURE: 98 MMHG

## 2018-01-22 VITALS — SYSTOLIC BLOOD PRESSURE: 137 MMHG | DIASTOLIC BLOOD PRESSURE: 98 MMHG

## 2018-01-22 NOTE — CPS PROVIDER INIT ASMT PSYCH
Psychiatric Admission
Crisis Worker's Note Reviewed: Yes
Patient Seen and Examined: Yes
Identifying Information:
Vikki is a 52-year-old  white male who was brought in to [facility]'s emergency department by ambulance after his wife called 911.  The 
patient reportedly told the police officers that he was having thoughts of 
suicide.
Chief Complaint:
Increasing depression and thoughts of suicide.  The patient reported that he has
been having thoughts of suicide pretty much since his discharge from [facility] 
about 3 weeks ago
Reaction to Hospitalization:
The patient was admitted voluntarily
 
History of Present Illness
Onset of Illness:
The patient reported that he felt that he left the hospital early and was still 
having thoughts of suicide (although he denied that at his discharge), the 
patient reported that his been struggling with thoughts of suicide almost daily 
for the past 3 weeks
Circumstances Leading to Admission:
Relapsed on sixpack of beer and wife called 911
Problem(s) Justifying Need for Admission:
Thoughts of suicide
 
Past Psychiatric History
Past Diagnosis(es)- if any:
Unspecified depressive disorder
Alcohol use disorder
Past Precipitating Factors- if any:
Relapsed on alcohol
- Include inpatient and outpatient treatment
Treatment History:
The patient has had previous inpatient psychiatric admissions as well as being 
in treatment with [facility]'s outpatient psychiatric services
History of Suicide Attempts or Gestures
The patient has no previous suicide attempts although he has talked about 
suicide and mentioned specific plans in the past
Substance Abuse History:
Alcohol use disorder, the patient denied using other substances.
Allergies:
Coded Allergies:
No Known Allergies (10/31/16)
 
Home Med List:
The patient was discharged on aripiprazole 10 mg daily
Gabapentin (Gabapentin) 600 mg at bedtime Bupropion HCl (Wellbutrin XL) 150 MG 
ONCE DAILY
Venlafaxine HCl (Effexor XR) 75 MG CAP.ER.  Once daily
- Include any medical condition(s) that may
- impact the patient's recovery/remission
 
Past History
 
Medical History
Neurological: NONE
EENT: NONE
Cardiovascular: hypertension, hyperlipidemia
Respiratory: NONE
Gastrointestinal: NONE
Hepatic: NONE
Renal: NONE
Musculoskeletal: chronic back pain
Psychiatric: bipolar disease, ETOH ABUSE
Endocrine: NONE
Blood Disorders: NONE
Cancer(s): NONE
GYN/Reproductive: n/a
History of MRSA: No
History of VRE: No
History of CDIFF: No
Isolation History: Standard
Influenza Vaccine: 02/12/15
 
Surgical History
Surgical History: non-contributory
 
Psychiatric Family/Social Hx
 
Family History
Psychiatric Illness:
Reportedly mother has "some kind of mental illness."
Substance Use:
Denied
Suicides:
Denied
 
Social History
Living Situation:
Patient's live with his long-term female partner/common-law wife do been 
together for over 16 years
Significant Relationships (family/friends):
Long-term female partner/common-law wife
Education:
High school education
Vocation/Occupation:
Works part-time at [employer], he reports that he enjoys his job
Legal:
The patient denied any legal entanglements
 
Healthly Behaviors Screening
 
Tobacco Screening
Tobacco Use from ED Docu: Never used
Daily Tobacco Use Amount/Type: => 5 Cigarettes daily
- If tobacco counseling indicated
- the following topics are required.
- #1 Recognizing dangerous situations.
- #2 Coping Skills.
- #3 Basic information about quitting.
Status of Tobacco Cessation Counseling: Not Applicable
Cessation Med Status Not Applicable
 
Alcohol Screening
- ETOH screen POS if BAL >=80 or Audit-C>= M4/F3
Audit-C Score from Diag Assess: 7
Blood Alcohol Level:
Laboratory Tests
 
 
 01/21
 
 1706
 
Toxicology 
 
  Serum Alcohol (<10 MG/DL) 41.0
 
 
 
Alcohol Use Screening Results: Pos per Audit C &/or BAL
- If ETOH counseling indicated
- the following topics are required.
- #1 Express concern about the patient's
- drinking at unhealthy levels, include informing
- of national norms for moderate drinking:
- men <= 14 drinks/week, max 4 drinks/occasion
- women <= 7 drinks/week, max 3 drinks/occasion
- #2 Providing feedback, including linking alcohol to
- negative physical effects (liver injury, hypertension)
- negative emotional effects (relationship problems and
- depression)
- negative occupational consequences (reduced work
- performance)
- #3 Advising the patient to abstain from alcohol or
- to drink below national norms for moderate drinking
- (as listed above).
Status of ETOH Use Counseling: #1, #2 AND #3 Completed.
 
Metabolic Screening
- Screen if on a Neuroleptic Medication
- Metabolic screening should include:
- Blood Pressure, BMI, Glucose or Hgb A1c, & a
- Lipid profile from within the past 365 days.
Metabolic Screening
() Not Applicable, patient not on a neuroleptic.
 
 OR
 
() Patient on a neuroleptic(s) .
     Enter below results for Hemoglobin A1C, 
     and lipid panel if obtained during the last 365 days.
 
BMI: 25.500     
 
Blood Pressure: 145/98
 
Laboratory Results From [facility] (If applicable): Lab
 
 
Cholesterol 251 MG/DL H 12/26/17 0650
 
Cholesterol/HDL Ratio 5 % H 12/26/17 0650
 
Glucose 94 mg/dL 01/21/18 1706
 
HDL Cholesterol 48 mg/dL 12/26/17 0650
 
Hemoglobin A1c 5.5 % 12/26/17 0650
 
LDL Cholesterol, Calc 156 mg/dL H 12/26/17 0650
 
Triglycerides 239 mg/dL H 12/26/17 0650
 
 
 
 
 
Exam and Plan
 
Mental Status Examination
Ambulation Status:
Steady gait
Appearance:
Appropriate
Attitude towards examiner:
Cooperative
Psychomotor activity:
Reduced psychomotor activity
Behavior:
Reduced psychomotor activity
Quality of speech:
Reduced speech
Affect:
Constricted
Mood:
Depressed
Suicidal Ideation:
He is still having suicidal ideation as of this morning
Homicidal Ideation:
Denied
Hallucinations:
Denied
Paranoid/Delusional Material:
Denied
Difficulties with thought organization:
No difficulties with thought organization
Insight:
Poor insight
Judgment:
Poor judgment
Orientation:
Oriented to time place and person
Cognition:
Alert
Memory Function:
No memory deficits
Estimate of intellectual functioning:
Average
 
Assets/Strengths
Patient Identified Assets/Strengths:
Patient is employed and likes his job
 
Impression/Plan
Impression and Plan:
52-year-old  white male who is known to staff at Inpatient Psychiatry 
from previous admissions.  He reportedly had 6 beers and an argument with his 
wife and reportedly told the police when they arrived that he was having 
thoughts of suicide
- Include all active medical diagnosis that require tx
DSM 5 Diagnosis(es):
Unspecified depressive disorder, 
alcohol use disorder, 
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
- Initial Tx Plan for Active Psych & Medical Conditions
Treatment Plan:
Inpatient psychiatric care.  15 minute checks
Nursing assessments every shift
Social work to obtain collateral and start discharge planning in
Group therapy
Milieu therapy
Resume outpatient medications
The patient will be evaluated daily by a psychiatrist
- Factors that would help patient function
- in a less restrictive setting.
Factors:
Abstinence from alcohol
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
## 2018-01-23 VITALS — SYSTOLIC BLOOD PRESSURE: 142 MMHG | DIASTOLIC BLOOD PRESSURE: 92 MMHG

## 2018-01-23 VITALS — SYSTOLIC BLOOD PRESSURE: 134 MMHG | DIASTOLIC BLOOD PRESSURE: 91 MMHG

## 2018-01-23 VITALS — SYSTOLIC BLOOD PRESSURE: 130 MMHG | DIASTOLIC BLOOD PRESSURE: 76 MMHG

## 2018-01-23 VITALS — DIASTOLIC BLOOD PRESSURE: 92 MMHG | SYSTOLIC BLOOD PRESSURE: 132 MMHG

## 2018-01-23 VITALS — DIASTOLIC BLOOD PRESSURE: 92 MMHG | SYSTOLIC BLOOD PRESSURE: 142 MMHG

## 2018-01-23 VITALS — SYSTOLIC BLOOD PRESSURE: 140 MMHG | DIASTOLIC BLOOD PRESSURE: 99 MMHG
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
## 2018-01-23 NOTE — CP SOUTH PROGRESS NOTE PSYCH
Psych (Inpt) Progress Note
Progress Note
Nursing Staff, Group and Activity Therapists, Social Work Staff, and 
Psychiatrist discussed the patient's treatment and progress 
 
Summary:
A 52-year-old  White male who was brought to [facility]'s ED after 
his wife called 911.  The patient reportedly told the police officers that he 
was having thoughts of suicide. The patient reported that he has been having 
thoughts of suicide pretty much since his discharge from [facility] about 3 weeks 
ago The patient reported that he felt that he left the hospital early and was 
still having thoughts of suicide (although he denied that at his discharge), the
patient reported that his been struggling with thoughts of suicide almost daily 
for the past 3 weeks
 
Mental Status Examination
The patient reported that he feels bumped out/tired/depressed, he was lying in
bed around 11:30 AM, he was up for breakfast, he was calm and cooperative
Reduced psychomotor activity, Reduced speech
Constricted affect, on and off suicidal ideation , denied violent thoughts or 
homicidal Ideation, denied Hallucinations, Denied feeling paranoid/Delusional 
Material, no difficulties with thought organization
Poor insight
Poor judgment
Oriented to time place and person
Alert
No memory deficits
 
Impression and Plan:
52-year-old  white male who is known to staff at Inpatient Psychiatry 
from previous admissions.  He reportedly had 6 beers and an argument with his 
wife and reportedly told the police when they arrived that he was having 
thoughts of suicide
 
DSM 5 Diagnosis(es):
Unspecified depressive disorder, 
alcohol use disorder, 
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
 
Treatment Plan Update:
Reduce Ativan dose for CIWA scores above 8
Continue inpatient psychiatric care.  
Continue 15 minute checks
Nursing assessments every shift
Social work to obtain collateral and start discharge planning in
Group therapy
Milieu therapy
Continue Abilify 10 mg daily

## 2018-01-23 NOTE — SOCIAL WORKER PROG NOTE PSYCH
Social Work Progress Note
Progress Note
3:42pm
This writer met with patient.  He reported experiencing depression at a 7-8/10. 
He identified feeling overwhelmed by his house being out of order as his primary
trigger for his depression.  Patient stated that he had been working with Diana Casalebethanie for individual therapy and would like to return to working with her 
upon discharge.  He stated that he is not interested in IOP due to plans to 
start a new second job.  Regarding alcohol use, patient stated that he drank 
once prior to coming to the ER, however, denied regular use and stated that he 
does not need substance use treatment, including inpatient treatment for 
substance use.  Patient refuses a family meeting.  He reported that he continues
to experience SI, "I fantasize about it"; he denied any plan.  He stated that he
feels safe on this unit and would inform staff immediately if feeling unsafe.

## 2018-01-24 VITALS — DIASTOLIC BLOOD PRESSURE: 88 MMHG | SYSTOLIC BLOOD PRESSURE: 142 MMHG

## 2018-01-24 VITALS — DIASTOLIC BLOOD PRESSURE: 86 MMHG | SYSTOLIC BLOOD PRESSURE: 136 MMHG

## 2018-01-24 VITALS — DIASTOLIC BLOOD PRESSURE: 78 MMHG | SYSTOLIC BLOOD PRESSURE: 137 MMHG

## 2018-01-24 VITALS — SYSTOLIC BLOOD PRESSURE: 133 MMHG | DIASTOLIC BLOOD PRESSURE: 83 MMHG

## 2018-01-24 VITALS — SYSTOLIC BLOOD PRESSURE: 137 MMHG | DIASTOLIC BLOOD PRESSURE: 78 MMHG

## 2018-01-24 VITALS — DIASTOLIC BLOOD PRESSURE: 83 MMHG | SYSTOLIC BLOOD PRESSURE: 133 MMHG

## 2018-01-24 VITALS — SYSTOLIC BLOOD PRESSURE: 136 MMHG | DIASTOLIC BLOOD PRESSURE: 86 MMHG

## 2018-01-24 NOTE — SOCIAL WORKER PROG NOTE PSYCH
Social Work Progress Note
Progress Note
4:10pm
This writer met with patient.  He reported feeling tired due to his medications 
and has noticed some improvement with mood.  He continues to report feelings of 
hopelessness.  Patient identified wanting a career in Trip[employer] as the
trigger for his depression.  He stated that he is not happy with working two or 
three jobs that he does not enjoy.  He stated that his wife is pushing him to 
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  He identified 
music as a passion of his and that he enjoys playing music with his children.  
Patient was not willing to have a family meeting at this time.  He signed an NEISHA
for his therapist, Diane Casalaina, MA, [facility] ([phone]) and was agreeable to
this writer contacting her.  This writer left a vm for her at 4:36pm with a call
back number.

## 2018-01-24 NOTE — CP SOUTH PROGRESS NOTE PSYCH
Psych (Inpt) Progress Note
Progress Note
Wednesday Jan 24, 2018:
Nursing Staff, Group and Activity Therapists, Social Work Staff, and 
Psychiatrist discussed the patient's treatment and progress 
 
Mental Status Examination:
The patient reported that he feels minor improvement in mood.
He said he continues to feel tired and thinks it is one of the medications.
He was calm and cooperative, reduced psychomotor activity, reduced speech, 
constricted affect, not very hopeful but denied thinking of ending his life, 
denied violent thoughts or homicidal Ideation, denied hallucinations, and denied
feeling paranoid. There were no delusions, no difficulties with thought 
organization, poor insight, 
oriented to time place and person, alert, no memory deficits
 
Impression and Plan:
A 52-year-old  White male who was brought to [facility]'s ED after 
his wife called 911 (reportedly had 6 beers and had a heated argument with his 
wife).  The patient reportedly told the police officers that he was having 
thoughts of suicide. The patient claimed that he has been having thoughts of 
suicide since his discharge from [facility] about 3 weeks ago/claimed that he was 
discharged from the hospital early.
He is showing slow improvement and complaining of tiredness (he believes it is a
medication side effect).
Diagnoses:
Unspecified depressive disorder, 
Alcohol use disorder, 
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
 
Treatment Plan Update:
I reviewed the medications with Vikki, one of the medications that may be 
causing his tiredness may be the lithium
We agreed on the following plan:
Reduce lithium to 300 mg twice daily
Continue CIWA scores for another 24 hours
Continue inpatient psychiatric care.  
Continue 15 minute checks
Nursing assessments every shift
Social work to obtain collateral and start discharge planning in
Group therapy
Milieu therapy
Continue Abilify 10 mg daily
Psychiatrist to evaluate patient daily

## 2018-01-25 VITALS — DIASTOLIC BLOOD PRESSURE: 94 MMHG | SYSTOLIC BLOOD PRESSURE: 142 MMHG

## 2018-01-25 VITALS — DIASTOLIC BLOOD PRESSURE: 95 MMHG | SYSTOLIC BLOOD PRESSURE: 121 MMHG

## 2018-01-25 VITALS — SYSTOLIC BLOOD PRESSURE: 146 MMHG | DIASTOLIC BLOOD PRESSURE: 102 MMHG

## 2018-01-25 VITALS — SYSTOLIC BLOOD PRESSURE: 145 MMHG | DIASTOLIC BLOOD PRESSURE: 97 MMHG

## 2018-01-25 VITALS — SYSTOLIC BLOOD PRESSURE: 121 MMHG | DIASTOLIC BLOOD PRESSURE: 95 MMHG

## 2018-01-25 NOTE — SOCIAL WORKER PROG NOTE PSYCH
Social Work Progress Note
Progress Note
10:50am
This writer met with patient.  He described his mood as "lilly hopeless."  
Patient was in his room and when asked by this writer about isolation, he stated
that he hadn't been, however, was tired from the medications.  Patient stated 
that he had considered talking to his wife about pursuing his interest in 
computer networking and later decided against this.  He stated that he would 
take a second job instead.  Patient abruptly ended this conversation and stated 
that he was going to group.

## 2018-01-25 NOTE — SOCIAL WORKER PROG NOTE PSYCH
Social Work Progress Note
Progress Note
EDEL HARDY
[id number]
1965
EDEL HARDY 
[id number]
 
Pended Authorization #
Client Authorization #
Type of Request
 
[id number]
[id number]
CONCURRENT
 
 
Date of Admission/ Start of Services
Requested From
Submission Date
   
01/22/2018
01/25/2018
01/25/2018

## 2018-01-25 NOTE — CP SOUTH PROGRESS NOTE PSYCH
Psych (Inpt) Progress Note
Progress Note
Vikki remains hopeless and depressed, seemed disinterested in the interview/
detached. He was calm and cooperative, reduced psychomotor activity, reduced 
speech, constricted affect, not very hopeful but denied thinking of ending his 
life, denied violent thoughts or homicidal Ideation, denied hallucinations, and 
denied feeling paranoid. There were no delusions, no difficulties with thought 
organization, poor insight, 
oriented to time place and person, alert, no memory deficits
 
Assessment:
A 52-year-old  White male who was brought to [facility]'s ED after 
his wife called 911 (reportedly had 6 beers and had a heated argument with his 
wife).  The patient reportedly told the police officers that he was having 
thoughts of suicide. The patient claimed that he has been having thoughts of 
suicide since his discharge from [facility] about 3 weeks ago/claimed that he was 
discharged from the hospital early.
He is showing slow improvement.
Diagnoses:
Unspecified depressive disorder
Alcohol use disorder
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
 
Treatment Plan Update:
Continue Abilify 10 mg daily
Continue Lithium 300 mg twice daily
D/C LUCRECIA 
Resume Naltrexone 50 mg daily
Continue inpatient psychiatric care.  
Continue 15 minute checks
Nursing assessments every shift
Social work to obtain collateral and start discharge planning in
Group therapy
Milieu therapy
Psychiatrist to evaluate patient daily

## 2018-01-26 VITALS — SYSTOLIC BLOOD PRESSURE: 136 MMHG | DIASTOLIC BLOOD PRESSURE: 93 MMHG

## 2018-01-26 VITALS — DIASTOLIC BLOOD PRESSURE: 98 MMHG | SYSTOLIC BLOOD PRESSURE: 146 MMHG

## 2018-01-26 VITALS — DIASTOLIC BLOOD PRESSURE: 92 MMHG | SYSTOLIC BLOOD PRESSURE: 143 MMHG

## 2018-01-26 VITALS — SYSTOLIC BLOOD PRESSURE: 137 MMHG | DIASTOLIC BLOOD PRESSURE: 90 MMHG

## 2018-01-26 NOTE — CP SOUTH PROGRESS NOTE PSYCH
Psych (Inpt) Progress Note
Progress Note
Vikki feels that his mood remains the same: i.e. hopeless, depressed, and still
having thoughts of suicide (e.g. fantasizing about staving self to death, but I 
would leave the house first), disinterested in the interview/detached, but calm 
and cooperative, reduced psychomotor activity, reduced speech, constricted 
affect but smiled once today, denied violent thoughts or homicidal Ideation, 
denied hallucinations, and denied feeling paranoid. There were no delusions, no 
difficulties with thought organization, poor insight, oriented to time place and
person, alert, no memory deficits
 
Assessment:
A 52-year-old  White male who was brought to [facility]'s ED after 
his wife called 911 (reportedly had 6 beers and had a heated argument with his 
wife).  The patient reportedly told the police officers that he was having 
thoughts of suicide. The patient claimed that he has been having thoughts of 
suicide since his discharge from [facility] about 3 weeks ago/claimed that he was 
discharged from the hospital early. He is showing slow improvement but still has
on-off fantasies about suicide including this morning
Diagnoses:
Unspecified depressive disorder
Alcohol use disorder
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
 
Treatment Plan Update:
Continue Abilify 10 mg daily
Continue Lithium 300 mg twice daily (going down gradually, wont focus on levels
)
Continue Naltrexone 50 mg daily
Continue Venlafaxine 225 mg daily (he had his first 225 mg dose this morning, up
from 150 mg yesterday)
Continue inpatient psychiatric care.  
Continue 15 minute checks
Nursing assessments every shift
Social work to obtain collateral and start discharge planning in
Group therapy
Milieu therapy
Psychiatrist to evaluate patient daily

## 2018-01-26 NOTE — SOCIAL WORKER PROG NOTE PSYCH
Social Work Progress Note
Progress Note
3:35pm
This writer met with patient.  He described his mood as "good."  He denied SI.  
Patient stated that he would like to schedule a family meeting with his wife/
partner, which she also requested.  Patient was encouraged to attend groups 
rather than isolating in his room.  To this, he proceeded to go to the kitchen 
and observe a game that was being played.
 
4:38pm
This writer spoke with Machelle Coughlin ([phone]) and scheduled a family 
meeting for Monday, 1/29/18, at 12pm.

## 2018-01-27 VITALS — SYSTOLIC BLOOD PRESSURE: 132 MMHG | DIASTOLIC BLOOD PRESSURE: 92 MMHG

## 2018-01-27 VITALS — SYSTOLIC BLOOD PRESSURE: 136 MMHG | DIASTOLIC BLOOD PRESSURE: 89 MMHG

## 2018-01-27 VITALS — SYSTOLIC BLOOD PRESSURE: 137 MMHG | DIASTOLIC BLOOD PRESSURE: 98 MMHG

## 2018-01-27 VITALS — SYSTOLIC BLOOD PRESSURE: 136 MMHG | DIASTOLIC BLOOD PRESSURE: 90 MMHG

## 2018-01-27 NOTE — CP SOUTH PROGRESS NOTE PSYCH
Psych (Inpt) Progress Note
Progress Note
Include the following elements, when applicable:
Involvement in the active treatment of the patient with behavioral observations 
of the patient and the patient's response to the treatment.
Review of the ongoing treatment process in the context of the treatment plan.
Indication of how multi-disciplinary staff members are carrying out the 
treatment plan.
Plans for future interventions and recommendations for revision of the treatment
plan.
Liaison with other physicians/providers.
Progress Note:
 
Stated that he was doing relatively well, denied any recent suicidal thoughts, 
was going to speak with his children on phone this morning. Stated that he had 
several period of sobriety around age 47, also his suicidal thoughts began 
around 10 years ago. Stated that he hoped he could remain sober; was planning to
start AA and hopeful that naltrexone would help. Got a 2nd job so he can provide
financially for his family and can move into a larger house. Future focused. 
Sleeping well. No issues with med changes. 
MSE: fair to poor grooming, quiet, mildly withdrawn, fair eye contact; right eye
clouding. Distant and disengaged but cooperative. No movement disorder evident. 
Speech is soft to regular volume, regular rate. Mood is down, affect is 
congruent and reactive somewhat. Thought process is linear. Does not appear to 
be disorganized or with delusional thought content. Thought content is free of 
suicidal thoughts, thoughts to harm others or delusional content. No 
hallucinations. Insight fair, judgment fair. Cognition grossly intact. 
A: 52 year old man with a history of alcohol use, depressive disorder, recurrent
suicidal thoughts, admitted with suicidal thoughts. Continues to have depressed,
constricted affect and slow reactivity. 
Plan: discussed abstinence, AA, encouraged following up w/ meetings. No med 
changes, tolerating well. continue plan of care. Encourage socialization and 
behavioral activation.

## 2018-01-28 VITALS — DIASTOLIC BLOOD PRESSURE: 93 MMHG | SYSTOLIC BLOOD PRESSURE: 129 MMHG

## 2018-01-28 VITALS — DIASTOLIC BLOOD PRESSURE: 79 MMHG | SYSTOLIC BLOOD PRESSURE: 133 MMHG

## 2018-01-28 VITALS — DIASTOLIC BLOOD PRESSURE: 97 MMHG | SYSTOLIC BLOOD PRESSURE: 132 MMHG

## 2018-01-28 VITALS — SYSTOLIC BLOOD PRESSURE: 126 MMHG | DIASTOLIC BLOOD PRESSURE: 84 MMHG

## 2018-01-28 NOTE — CP SOUTH PROGRESS NOTE PSYCH
Psych (Inpt) Progress Note
Progress Note
Include the following elements, when applicable:
Involvement in the active treatment of the patient with behavioral observations 
of the patient and the patient's response to the treatment.
Review of the ongoing treatment process in the context of the treatment plan.
Indication of how multi-disciplinary staff members are carrying out the 
treatment plan.
Plans for future interventions and recommendations for revision of the treatment
plan.
Liaison with other physicians/providers.
Progress Note:
 
Stated that he is thinking about going to , would consider getting a sponsor. 
Rates depression as 5/10, no anxiety. Feels a few months ago his depression was 
a lot lower, at a scale of 1-2 only, that his life was sunnier and more 
hopeful. Unsure what happened, thinks it may be due to needing to get a 2nd job.
We discussed his sleep hygiene, he works 5-10pm and stays up w/ his wife and 4 
dogs until around 3am, then gets his 4 children up for school in the morning. 
Discussed shifting his sleep and his wifes sleep a bit closer to a normal 
schedule, to benefit his mood, his weight, energy etc. 
MSE: fair to poor grooming, quiet. Fair eye contact. Nonchalant but receptive to
our discussion. No tic or tremor noted. His speech is normal. Mood is neutral to
down, his affect is constricted and congruent to mood. Thinking is logical. No 
evidence of delusional thought content, no thoughts to harm self/others, no 
internal preoccupation. Insight fair, judgment fair. Cognition grossly intact. 
A: 52 year old man with a history of alcohol use, depressive disorder, recurrent
suicidal thoughts, admitted with suicidal thoughts. Continues to have depressed,
constricted affect and slow reactivity but may be more of a nonchalance rather 
than active depression. Focused on his future, seeing his family and abstinence 
from alcohol.  
Plan: discussed seeking a sponsor through , encouraged more daylight hours 
being awake. Continue current plan of care. Stabilizing.

## 2018-01-29 VITALS — SYSTOLIC BLOOD PRESSURE: 138 MMHG | DIASTOLIC BLOOD PRESSURE: 91 MMHG

## 2018-01-29 VITALS — DIASTOLIC BLOOD PRESSURE: 91 MMHG | SYSTOLIC BLOOD PRESSURE: 141 MMHG

## 2018-01-29 VITALS — SYSTOLIC BLOOD PRESSURE: 143 MMHG | DIASTOLIC BLOOD PRESSURE: 92 MMHG

## 2018-01-29 VITALS — DIASTOLIC BLOOD PRESSURE: 95 MMHG | SYSTOLIC BLOOD PRESSURE: 125 MMHG

## 2018-01-29 NOTE — SOCIAL WORKER PROG NOTE PSYCH
Social Work Progress Note
Progress Note
12:21pm
This writer attempted to reach patient's partner, Machelle, regarding today's 
12pm family meeting due to not arriving at that time.  A vm was not left on the 
home number as it did not appear to be Machelle's number.  Patient provided 
another number, cell: [phone], for Machelle, but the vm was full.  Patient 
was informed of unsuccessful attempt.
 
4:00pm
This writer met with patient.  He stated that Machelle was sick today and 
therefore unable to attend the 12pm family meeting.  He reported that his mood 
is "good" and feels that the improvement is hearing that his dog had puppies.  
Patient continues to report decreased motivation.  He denied SI/HI/AH/VH.  
Patient was agreeable to scheduling another family meeting.
 
5:34pm
This writer attempted to reach Machelle, [phone], however, she was not 
available.  This writer will attempt to reach her another time.

## 2018-01-29 NOTE — CP SOUTH PROGRESS NOTE PSYCH
Psych (Inpt) Progress Note
Progress Note
I reviewed the notes of Dr. Marlen Serrano MD (covering psychiatrist for 
the weekend of Jan 27 & 28, 2018)
The Nursing Staff, Group therapists, social work staff, and psychiatrist 
discussed the patients progress, and reviewed the patients treatment/care plan
in the team meeting
 
The patient believes that his depression is the same: i.e. about 5/10.
He reported that he slept well with current medication doses
He shaved and is wearing his reading glasses
Although he reported his mood as not being any better, he looked brighter in 
affect and smiled a couple of times
Good eye contact, no tics or tremors noted, speech is normal, coherent/logical, 
no evidence of delusional thoughts
When asked about thoughts of suicide his response is not really.
Denied thoughts to harm others, no internal preoccupation, and fair insight fair
, 
Cognition grossly intact. 
 
A: 
52 year old man with a history of alcohol use, depressive disorder, recurrent 
suicidal thoughts, admitted with suicidal thoughts. Continues to have depressed 
mood and slow reactivity but may be more of a nonchalance rather than active 
depression. 
 
Plan:
Continue Abilify 10 mg daily
Continue Lithium 300 mg twice daily (wont focus on levels, as my plan is to 
eliminate lithium-down the road-- as benefits (lack of, that is) do not justify 
risks
Continue Naltrexone 50 mg daily
Continue Venlafaxine 225 mg daily 
Continue inpatient psychiatric care.  
Continue 15 minute checks
Nursing: continue assessments every shift
Social work: to obtain collateral and start discharge planning in
Group therapy
Milieu therapy
Psychiatrist: continue to evaluate patient daily

## 2018-01-30 VITALS — SYSTOLIC BLOOD PRESSURE: 140 MMHG | DIASTOLIC BLOOD PRESSURE: 97 MMHG

## 2018-01-30 VITALS — DIASTOLIC BLOOD PRESSURE: 97 MMHG | SYSTOLIC BLOOD PRESSURE: 136 MMHG

## 2018-01-30 VITALS — DIASTOLIC BLOOD PRESSURE: 90 MMHG | SYSTOLIC BLOOD PRESSURE: 127 MMHG

## 2018-01-30 VITALS — DIASTOLIC BLOOD PRESSURE: 97 MMHG | SYSTOLIC BLOOD PRESSURE: 137 MMHG

## 2018-01-30 NOTE — CP SOUTH PROGRESS NOTE PSYCH
Psych (Inpt) Progress Note
Progress Note
The Nursing Staff, Group therapists, social work staff, and psychiatrist 
discussed the patients progress, and reviewed the patients care plan in the 
team meeting
 
The patient reported feeling better, he thinks he is getting close to discharge,
I asked him to set up a discharge date, he picked tomorrow, he reported that he 
slept well with current medications, he looked brighter in affect, good eye 
contact, no tics or tremors noted, speech is normal, coherent/logical, no 
evidence of delusional thoughts, denied thinking of suicide, denied thoughts to 
harm others, no internal preoccupation, and cognition grossly intact. 
 
Assessment: 
Vikki is a 52 year old man with a history of alcohol use, depressive disorder, 
recurrent suicidal thoughts, admitted with suicidal thoughts. 
Risk factors: 
Recent thoughts of suicide, history of alcohol use disorder
 
Plan:
Continue Abilify 10 mg daily
Continue Lithium 300 mg twice daily (wont focus on levels, as my plan is to 
eliminate lithium-down the road-- as benefits (lack of, that is) do not justify 
risks
Continue Naltrexone 50 mg daily
Continue Venlafaxine 225 mg daily 
Continue inpatient psychiatric care: Continue 15 minute checks; Nursing: 
continue assessments every shift; Social work: to obtain collateral and start 
discharge planning; Group therapy; Milieu therapy; Psychiatrist: continue to 
evaluate patient daily

## 2018-01-30 NOTE — SOCIAL WORKER PROG NOTE PSYCH
Social Work Progress Note
Progress Note
1:30pm
This writer met with patient.  He expressed interest in discharging tomorrow and
would like to return to individual therapy with D. Casalaina and medication 
management with ELIZABETH Jeffries ( OPS).  He maintains that he is not 
interested in IOP.  Patient stated, "I want to participate in life."  He denied 
SI/HI/AH/VH.  He was agreeable to this writer scheduling outpatient appointments
with D. Casalaina and  OPS.
 
2:42pm
A vm was left for patient's individual therapist, Diane Casalaina ([phone])
, with a call back number requesting a return call.

## 2018-01-31 VITALS — SYSTOLIC BLOOD PRESSURE: 132 MMHG | DIASTOLIC BLOOD PRESSURE: 90 MMHG

## 2018-01-31 VITALS — DIASTOLIC BLOOD PRESSURE: 89 MMHG | SYSTOLIC BLOOD PRESSURE: 126 MMHG
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
## 2018-01-31 NOTE — SOCIAL WORKER PROG NOTE PSYCH
Social Work Progress Note
Progress Note
10:45am
This writer met with patient.  He reported overall improvment with symptoms, 
however, did not sleep well last night due to his roommate being loud during the
night.  Patient stated that he would like to discharge today, which had been 
discussed in the team meeting this morning.  Patient stated that he does not 
want to attend IOP and would like to return to individual therapy with Diane Casalaina and medicaiton management through  OPS.  Patient denied SI/HI/AH/VH.
 He identified a safety plan in which he would inform "Sowmya Carty or go tot 
[facility]" if feeling unsafe or have other concerns.  He was informed that he 
would be provided with crisis numbers and warm lines prior to discharging.  He 
was agreeable to utilizing these resources.
 
This writer left a vm for Diane Casalaina at 10:51am and spoke with her at 1:
07pm.  Sowmya was agreeable to the patient not attending IOP, stating, "he could 
run the group."  She stated that she would meet with him once to twice weekly as
needed.  She was also informed of the plan for the patient to continue with [facility] for medication management.  Sowmya stated that she felt that the patient had 
benefitted from Antabuse in the past and requested that this is relayed to the 
prescriber.  A message was sent to Dr. Gharaibeh and [facility]AB[facility] Jeffries 
regarding this.  An appointment was scheduled with Sowmya for 2/5/18 at 10am.
 
This writer reviewed the following appointments with the patient, who accepted 
them:
-  OPS intake: Thursday, 2/8/18 at 8:30am
- [facility] medication appointment: Tuesday, 2/27/18, at 9:30am with ELIZABETH Jeffries
- Diane Casalaina, MA, [facility], CAC: [phone], 2/5/18 at 10am
 
Discharge plans were reviewed with patient's wife, Machelle, when she presented 
to provide transportation for the patient from the hospital to home.  She was 
agreeable to these discharge plans.  AA was also discussed and patient accepted 
a meeting book.  He also requested two copies of return to work letters from Dr.
Gharaibeh.  He was provided with these copies, they were also filed in his 
chart.
Faxed Referral(s) 1 
   Referred To:  OPS
   Transition of Care Documents sent: Health Summary
   Faxed to: Kylah/ELIZABETH Jeffries
   Fax #: [phone]
   Faxed by: DENA Gonzáles LCSW
   Date faxed: 01/31/18
   Time Faxed: [phone]
Faxed Referral(s) 2 
   Referred To: Diane Casalaina, MA, [facility]
   Transition of Care Documents sent: Health Summary
   Faxed to: Diane Casalaina
   Fax #: [phone]
   Faxed by: DENA Gonzáles LCSW
   Date faxed: 01/31/18
   Time Faxed: [phone]
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
## 2018-09-19 ENCOUNTER — HOSPITAL ENCOUNTER (EMERGENCY)
Dept: HOSPITAL 68 - ERH | Age: 53
LOS: 1 days | End: 2018-09-20
Payer: COMMERCIAL

## 2018-09-19 VITALS — WEIGHT: 200 LBS | HEIGHT: 72 IN | BODY MASS INDEX: 27.09 KG/M2

## 2018-09-19 DIAGNOSIS — I10: ICD-10-CM

## 2018-09-19 DIAGNOSIS — F10.10: ICD-10-CM

## 2018-09-19 DIAGNOSIS — Z87.891: ICD-10-CM

## 2018-09-19 DIAGNOSIS — F31.9: ICD-10-CM

## 2018-09-19 DIAGNOSIS — I86.1: Primary | ICD-10-CM

## 2018-09-19 DIAGNOSIS — E78.5: ICD-10-CM

## 2018-09-19 DIAGNOSIS — N43.3: ICD-10-CM

## 2018-09-20 VITALS — SYSTOLIC BLOOD PRESSURE: 159 MMHG | DIASTOLIC BLOOD PRESSURE: 103 MMHG

## 2018-09-20 LAB
ABSOLUTE GRANULOCYTE CT: 7.8 /CUMM (ref 1.4–6.5)
BASOPHILS # BLD: 0 /CUMM (ref 0–0.2)
BASOPHILS NFR BLD: 0.3 % (ref 0–2)
EOSINOPHIL # BLD: 0.5 /CUMM (ref 0–0.7)
EOSINOPHIL NFR BLD: 4.1 % (ref 0–5)
ERYTHROCYTE [DISTWIDTH] IN BLOOD BY AUTOMATED COUNT: 13 % (ref 11.5–14.5)
GRANULOCYTES NFR BLD: 66.6 % (ref 42.2–75.2)
HCT VFR BLD CALC: 41.6 % (ref 42–52)
LYMPHOCYTES # BLD: 2.5 /CUMM (ref 1.2–3.4)
MCH RBC QN AUTO: 28.7 PG (ref 27–31)
MCHC RBC AUTO-ENTMCNC: 34.6 G/DL (ref 33–37)
MCV RBC AUTO: 82.9 FL (ref 80–94)
MONOCYTES # BLD: 0.9 /CUMM (ref 0.1–0.6)
PLATELET # BLD: 251 /CUMM (ref 130–400)
PMV BLD AUTO: 8.6 FL (ref 7.4–10.4)
RED BLOOD CELL CT: 5.02 /CUMM (ref 4.7–6.1)
WBC # BLD AUTO: 11.6 /CUMM (ref 4.8–10.8)

## 2018-09-20 NOTE — CT SCAN REPORT
EXAMINATION:
CT ABDOMEN AND PELVIS WITH CONTRAST
 
CLINICAL INFORMATION:
Bilateral varicocele on ultrasound, question retroperitoneal mass
 
COMPARISON:
None
 
TECHNIQUE:
Multidetector volumetric imaging was performed of the abdomen and pelvis
following IV administration of 95 mL of Optiray 320 intravenous contrast.
Sagittal and coronal reformatted images were obtained on the technologist's
workstation.
 
DLP:
492.33 mGy-cm
 
FINDINGS:
 
LUNG BASES: The visualized lung bases are unremarkable.
 
LIVER, GALLBLADDER, AND BILIARY TREE: The liver is normal in size, shape, and
attenuation. No focal hepatic lesion or biliary ductal dilatation is present.
The gallbladder is unremarkable with no evidence of radiopaque gallstones,
gallbladder wall thickening, or obvious pericholecystic inflammatory changes.
 
 
PANCREAS: Unremarkable.
 
SPLEEN: Unremarkable.
 
ADRENAL GLANDS: Unremarkable.
 
KIDNEYS AND URETERS: The kidneys are normal in size, shape, and attenuation.
There are a few tiny bilateral renal calculi without hydronephrosis. No
ureteral calculi seen. There is a subcentimeter hypoattenuating lesion in the
lower left kidney which may reflect a cyst or fat within an angiomyolipoma.
No perinephric stranding.
 
BLADDER: Unremarkable.
 
GASTROINTESTINAL TRACT: There is mild colonic diverticulosis. Assessment for
wall thickening in some segments of the colon is limited due to luminal
collapse, though no significant pericolonic stranding is seen to strongly
suggest a colitis. No evidence of bowel obstruction. The appendix is
unremarkable. No free fluid or free air is seen.
 
ABDOMINAL WALL: No significant hernia is appreciated.
 
LYMPH NODES: Normal.
 
VASCULAR: Unremarkable.
 
PELVIC VISCERA: Unremarkable.
 
OSSEOUS STRUCTURES: Unremarkable.
 
IMPRESSION:
No evidence of retroperitoneal adenopathy or mass. No acute findings
identified in the abdomen/pelvis.

## 2018-09-20 NOTE — ED GI/GU/ABDOMINAL COMPLAINT
History of Present Illness
 
General
Chief Complaint: Male Genitourinary Problems
Stated Complaint: "SWOLLEN LEFT TESTICLE" PER PATIENT
Source: patient, old records
Exam Limitations: no limitations
 
Vital Signs & Intake/Output
Vital Signs & Intake/Output
 Vital Signs
 
 
Date Time Temp Pulse Resp B/P B/P Pulse O2 O2 Flow FiO2
 
     Mean Ox Delivery Rate 
 
09/20 0500  95 20 159/103  97 Room Air  
 
09/20 0019 98.2 96 17 154/86  98 Room Air Room Air 
 
 
 
Allergies
Coded Allergies:
No Known Allergies (06/05/18)
 
Reconcile Medications
Aripiprazole (Abilify) 10 MG TABLET   10 MG PO DAILY mood
Cyclobenzaprine HCl 10 MG TABLET   1 TAB PO TID PRN PAIN
Ibuprofen 800 MG TABLET   1 TAB PO TID PRN PAIN 
Lithium Carbonate 300 MG CAPSULE   1 CAP PO BID mood
Metoprolol Succ XL (Toprol XL) 25 MG TAB   25 MG PO 1700 blood pressure
Naltrexone HCl 50 MG TABLET   50 MG PO DAILY cravings
Trazodone HCl 100 MG TABLET   100 MG PO AT BEDTIME insomnia
Venlafaxine HCl (Venlafaxine HCl ER) 75 MG CAP.ER.24H   1 CAP PO DAILY with 150 
mg = 225 mg
Venlafaxine HCl (Venlafaxine HCl ER) 150 MG CAP.ER.24H   1 CAP PO DAILY with 75 
mg= 225 mg
 
Triage Note:
PT TO TRIAGE FOR LEFT TEST SWELLING FOR 2-3 WEEKS.
 PT STATES IT IS SLIGHTLY TENDER. DENIES FEVERS,
 DENIES ANY PENILE DRAINAGE.
Triage Nurses Notes Reviewed? yes
Onset: 2-3 weeks
Duration: week(s):, constant, continues in ED, getting worse
Timing: recent history
Quality/Severity: aching, fullness, moderate
Location: scrotal
Radiation: no radiation
Activities at Onset: rest
Prior Abdominal Problems: none
Past Sexual History: Unobtainable at this time
Modifying Factors:
Worsens With: movement, palpation. 
Associated Symptoms: swelling
HPI:
To 3 weeks prior to admission patient complains of increasing left scrotal 
swelling and testicular pain especially when ejaculating.
He denies fever chills nausea vomiting diarrhea abdominal pain chest pain 
shortness breath headache dysuria rash bleeding penile discharge unprotected 
sex.
 
Past History
 
Travel History
Traveled to [place] past 21 day No
 
Medical History
Any Pertinent Medical History? see below for history
Neurological: NONE
EENT: NONE
Cardiovascular: hypertension, hyperlipidemia
Respiratory: NONE
Gastrointestinal: NONE
Hepatic: NONE
Renal: NONE
Musculoskeletal: NA
Psychiatric: bipolar disease, ETOH ABUSE
Endocrine: NONE
Blood Disorders: NONE
Cancer(s): NONE
GYN/Reproductive: n/a
History of MRSA: No
History of VRE: No
History of CDIFF: No
 
Surgical History
Surgical History: none
 
Psychosocial History
Who do you live with Family
What is your primary language English
Tobacco Use: Quit >30 days ago
ETOH Use: denies use
Illicit Drug Use: denies illicit drug use
 
Family History
Hx Contributory? No
 
Review of Systems
 
Review of Systems
Constitutional:
Reports: no symptoms. 
EENTM:
Reports: no symptoms. 
Respiratory:
Reports: no symptoms. 
Cardiovascular:
Reports: no symptoms. 
GI:
Reports: no symptoms. 
Genitourinary:
Reports: see HPI, pain. 
Musculoskeletal:
Reports: no symptoms. 
Skin:
Reports: no symptoms. 
Neurological/Psychological:
Reports: no symptoms. 
Hematologic/Endocrine:
Reports: no symptoms. 
Immunologic/Allergic:
Reports: no symptoms. 
All Other Systems: Reviewed and Negative
 
Physical Exam
 
Physical Exam
General Appearance: well developed/nourished, alert, awake, anxious, mild 
distress
Head: atraumatic, normal appearance
Eyes:
Bilateral: normal appearance, PERRL, EOMI, normal inspection. 
Ears, Nose, Throat, Mouth: hearing grossly normal, moist mucous membrane
Neck: normal inspection, supple, full range of motion, normal alignment
Respiratory: normal breath sounds, chest non-tender, no respiratory distress, 
quiet respiration, lungs clear
Cardiovascular: regular rate/rhythm, normal peripheral pulses, norml femoral 
pulses equa
Peripheral Pulses:
4+ carotid (R), 4+ carotid (L)
Gastrointestinal: normal bowel sounds, soft, non-tender, no organomegaly
Male Genitals: normal cremaster reflex, scrotum tenderness (L), testicular 
tenderness (L)
Back: normal inspection, normal range of motion
Extremities: normal range of motion, no ligament instability
Neurologic/Psych: no motor/sensory deficits, awake, alert, oriented x 3, normal 
gait, normal mood/affect, CNs II-XII nml as tested
Skin: intact, normal color, warm/dry
 
Core Measures
ACS in differential dx? No
Sepsis Present: No
Sepsis Focused Exam Completed? No
 
Progress
Differential Diagnosis: epididymitis, hernia, testicular torsion, UTI/pyelo
Plan of Care:
 Orders
 
 
Procedure Date/time Status
 
URINALYSIS 09/20 0011 Complete
 
COMPREHENSIVE METABOLIC PANEL 09/20 0011 Complete
 
CBC WITHOUT DIFFERENTIAL 09/20 0011 Complete
 
 
 Laboratory Tests
 
 
 
09/20/18 0350:
Urine Color YEL, Urine Clarity CLEAR, Urine pH 7.0, Ur Specific Gravity 1.015, 
Urine Protein NEG, Urine Ketones NEG, Urine Nitrite NEG, Urine Bilirubin NEG, 
Urine Urobilinogen 0.2, Ur Leukocyte Esterase NEG, Ur Microscopic EXAM NOT 
REQUIRED, Urine Hemoglobin NEG, Urine Glucose NEG
 
09/20/18 0230:
Anion Gap 10, Estimated GFR > 60, BUN/Creatinine Ratio 11.7, Glucose 94, Calcium
9.8, Total Bilirubin 0.4, AST 33, ALT 54, Alkaline Phosphatase 80, Total Protein
7.3, Albumin 4.7, Globulin 2.6, Albumin/Globulin Ratio 1.8, CBC w Diff NO MAN 
DIFF REQ, RBC 5.02, MCV 82.9, MCH 28.7, MCHC 34.6, RDW 13.0, MPV 8.6, Gran % 
66.6, Lymphocytes % 21.3, Monocytes % 7.7, Eosinophils % 4.1, Basophils % 0.3, 
Absolute Granulocytes 7.8  H, Absolute Lymphocytes 2.5, Absolute Monocytes 0.9  
H, Absolute Eosinophils 0.5, Absolute Basophils 0
 
Diagnostic Imaging:
Viewed by Me: Ultrasound.  Discussed w/RAD: Ultrasound. 
Radiology Impression: 1.  No evidence of testicular torsion. 2.  Echogenic focus
in the right testicle measuring 0.3 cm, without shadowing to indicate 
calcification. Malignancy cannot be excluded. 3.  Bilateral varicocele, a 
finding which can be due to compression of testicular venous drainage by 
retroperitoneal tumor/mass. This would be best further assessed with contrast-
enhanced CT of the abdomen/pelvis. 4.  Bilateral hydrocele., No evidence of 
retroperitoneal adenopathy or mass. No acute findings identified in the abdomen/
pelvis.
Initial ED EKG: none
 
Departure
 
Departure
Time of Disposition: 0542
Disposition: HOME OR SELF CARE
Condition: Stable
Clinical Impression
Primary Impression: Bilateral varicoceles
Secondary Impressions: Bilateral hydrocele
Referrals:
Beena Hassan DO (PCP/Family)
 
Santiago Araujo MD
Call for urology follow up
 
Departure Forms:
Customer Survey
General Discharge Information
 
Disposition: HOME OR SELF CARE
Condition: Stable
Referrals:
Beena Hassan DO (PCP/Family)
 
Departure Forms:
Customer Survey
General Discharge Information

## 2018-09-20 NOTE — ULTRASOUND REPORT
EXAMINATION:
US SCROTUM
 
CLINICAL INFORMATION:
Testicular pain
 
COMPARISON:
None
 
TECHNIQUE:
A sonogram of the scrotum was performed assessing gray-scale appearance and
color Doppler flow. Spectral analysis and Doppler interrogation was
performed.
 
FINDINGS:
 
RIGHT: Right testicle measures 4.2 x 2.5 x 2.9 cm, volume 22 mL. Parenchymal
echotexture is normal. There is a nonshadowing echogenic focus in the right
testicle measuring 0.3 cm. Normal symmetric intratesticular flow is
visualized.
 
Right epididymal head is normal in size. Moderate right hydrocele. Right
varicocele noted.
 
LEFT: Left testicle measures 4.5 x 3.2 x 2.9 cm, volume 30 mL. Parenchymal
echotexture is normal. No focal testicular parenchymal lesions are
visualized. Normal symmetric intratesticular flow is visualized. Appendix
testis is noted.
 
Left epididymal head is normal in size. Moderate left hydrocele. Left
varicocele noted.
 
IMPRESSION:
1.  No evidence of testicular torsion.
2.  Echogenic focus in the right testicle measuring 0.3 cm, without shadowing
to indicate calcification. Malignancy cannot be excluded.
3.  Bilateral varicocele, a finding which can be due to compression of
testicular venous drainage by retroperitoneal tumor/mass. This would be best
further assessed with contrast-enhanced CT of the abdomen/pelvis.
4.  Bilateral hydrocele.